# Patient Record
Sex: FEMALE | Race: WHITE | Employment: OTHER | URBAN - METROPOLITAN AREA
[De-identification: names, ages, dates, MRNs, and addresses within clinical notes are randomized per-mention and may not be internally consistent; named-entity substitution may affect disease eponyms.]

---

## 2017-01-13 ENCOUNTER — OFFICE VISIT (OUTPATIENT)
Dept: CARDIOLOGY CLINIC | Age: 82
End: 2017-01-13

## 2017-01-13 VITALS
HEIGHT: 65 IN | WEIGHT: 117.4 LBS | SYSTOLIC BLOOD PRESSURE: 138 MMHG | DIASTOLIC BLOOD PRESSURE: 82 MMHG | HEART RATE: 64 BPM | BODY MASS INDEX: 19.56 KG/M2 | OXYGEN SATURATION: 98 % | RESPIRATION RATE: 16 BRPM

## 2017-01-13 DIAGNOSIS — G47.00 INSOMNIA, UNSPECIFIED TYPE: ICD-10-CM

## 2017-01-13 DIAGNOSIS — E78.00 PURE HYPERCHOLESTEROLEMIA: Chronic | ICD-10-CM

## 2017-01-13 DIAGNOSIS — I48.0 PAROXYSMAL ATRIAL FIBRILLATION (HCC): Primary | ICD-10-CM

## 2017-01-13 DIAGNOSIS — F43.21 GRIEF REACTION: ICD-10-CM

## 2017-01-13 RX ORDER — SERTRALINE HYDROCHLORIDE 50 MG/1
TABLET, FILM COATED ORAL
Qty: 30 TAB | Refills: 5 | Status: SHIPPED | OUTPATIENT
Start: 2017-01-13 | End: 2017-02-09 | Stop reason: SDUPTHER

## 2017-01-13 RX ORDER — TRAZODONE HYDROCHLORIDE 50 MG/1
50 TABLET ORAL
Qty: 30 TAB | Refills: 1 | Status: SHIPPED | OUTPATIENT
Start: 2017-01-13 | End: 2017-04-12 | Stop reason: SDUPTHER

## 2017-01-13 NOTE — PROGRESS NOTES
[facility] [address], [address] [facility] [phone]; Fax [phone]      Patient: Ricco Mello  : 1928      Today's Date: 2017      HISTORY OF PRESENT ILLNESS:     History of Present Illness:  Ms. Carmelita De La Torre is here for follow-up PAF with RVR. Last seen in October by Dr. Izabella Lyons; by me in . Diltiazem increased to 180 mg daily and Eliquis 2.5 mg bid. Patient denies exertional chest pain, palpitations, edema, syncope or near-syncope, orthopnea or paroxysmal nocturnal dyspnea. Has some GROVE when walking up hills. Mrs. Carmelita De La Torre continues to receive grief counseling (  last year after 76 years of marriage), but counselor is only able to travel to her [place] once per month. Mrs. Carmelita De La Torre is spending more time alone. Reports decreased appetite, anhedonia, has to \"push\" herself to do anything. Has started taking trazodone in the morning because \"it doesn't help me sleep and I heard it was an antidepressant. \"  She has not slept well for years (cared for her  during his long illness prior to his death). EKG 6/17/15 - Afib with RVR, rate 124 (visualized by me)  EKG 6/17/15 - NSR, normal (visualized by me)  Echo 6/17/15 - LVEF 55-60%, normal atrial sizes  Lexiscan Cardiolite 6/18/15 - normal perfusion, LVEF 77%  Holter :  Sinus with artifact      PAST MEDICAL HISTORY:     Past Medical History   Diagnosis Date    Acid reflux     Depression     Hypercholesterolemia     PAF (paroxysmal atrial fibrillation) ([facility] [address]      On 6/17/15 she presented in Afib with RVR; spontaneously converted to NSR. Also Afib with RVR on 16.       Uterine prolapse          Past Surgical History   Procedure Laterality Date    Hx cholecystectomy      Hx appendectomy         MEDICATIONS:     Current Outpatient Prescriptions   Medication Sig Dispense Refill    apixaban (ELIQUIS) 2.5 mg tablet Take 1 tablet by mouth two  times daily 180 Tab 1    conjugated estrogens (PREMARIN) 0.625 mg/gram vaginal cream Insert 0.5 g into vagina every other day.  nitrofurantoin (MACRODANTIN) 50 mg capsule Take  by mouth as needed.  acetaminophen (TYLENOL) 325 mg tablet Take  by mouth every four (4) hours as needed for Pain.  diltiazem CD (CARDIZEM CD) 180 mg ER capsule Take 1 Cap by mouth daily. 30 Cap 12    pravastatin (PRAVACHOL) 40 mg tablet Take 0.5 Tabs by mouth nightly. 30 Tab 12    traZODone (DESYREL) 50 mg tablet Take  by mouth nightly. Began again on Friday.  omeprazole (PRILOSEC) 40 mg capsule Take 40 mg by mouth daily. Indications: GASTROESOPHAGEAL REFLUX  3    fish oil-omega-3 fatty acids (FISH OIL) 340-1,000 mg capsule Take 1 Cap by mouth daily.  cholecalciferol (VITAMIN D3) 1,000 unit tablet Take 1,000 Units by mouth daily.  ascorbic acid (VITAMIN C) 500 mg tablet Take 500 mg by mouth daily.  docusate sodium (COLACE) 100 mg capsule Take 100 mg by mouth nightly. Allergies   Allergen Reactions    Penicillins Swelling    Prednisone Other (comments)     Altered mental status. SOCIAL HISTORY:      Social History   Substance Use Topics    Smoking status: Never Smoker    Smokeless tobacco: Never Used    Alcohol use No       FAMILY HISTORY:     Family History   Problem Relation Age of Onset    Heart Disease Brother      irregular heart beat       REVIEW OF SYMPTOMS:      Review of Symptoms:  Constitutional: Negative for fever, chills  HEENT: Negative for vision changes. Respiratory: Negative for cough  Cardiovascular: Negative for orthopnea, syncope, and PND. Gastrointestinal: Negative for abdominal pain, diarrhea, or melena  Genitourinary: Negative for dysuria  Musculoskeletal: Negative for myalgias. + back pain   Skin: Negative for rash  Heme: No problems bleeding. Neurological: Negative for speech change and focal weakness.        PHYSICAL EXAM:      Physical Exam:  Visit Vitals    /82 (BP 1 Location: Left arm, BP Patient Position: Sitting)    Pulse 64    Resp 16    Ht 5' 5\" (1.651 m)    Wt 117 lb 6.4 oz (53.3 kg)    SpO2 98%    BMI 19.54 kg/m2        Patient appears generally well, mood and affect are appropriate and pleasant. HEENT: Normocephalic, atraumatic. Neck Exam: Supple  Lung Exam: Clear to auscultation, even breath sounds. Cardiac Exam: RRR with no murmur  Abdomen: Soft, non-tender  Extremities: No lower extremity edema. Psych: Appropriate affect      CARDIAC DIAGNOSTICS:      Cardiac Evaluation Includes:  EKG 6/17/15 - Afib with RVR, rate 124 (visualized by me)  EKG 6/17/15 - NSR, normal (visualized by me)  Echo 6/17/15 - LVEF 55-60%, normal atrial sizes  Lexiscan Cardiolite 6/18/15 - normal perfusion, LVEF 77%    ASSESSMENT AND PLAN:      Encounter Diagnoses   Name Primary?  Paroxysmal atrial fibrillation (HCC) Yes    Pure hypercholesterolemia     Grief reaction     Insomnia, unspecified type      Mrs. Ashish Dodd is doing well from a cardiac standpoint. She remains in sinus rhythm today. Will continue with diliazem and Eliquis. She was reminded that, if she became symptomatic with her afib, she should contact us. Her grief, depression and insomnia continue. She was encouraged to continue grief counseling, but to be seen at least twice per month. However, I believe she is also quite depressed. Long discussion with patient and her daughter about depression. She denies homicidal or suicidal ideation; contracts for safety. Will start sertraline 50 mg qam. Since trazodone doesn't seem to help her much, she was encouraged to discontinue it unless she takes it at bedtime as a sleep aid. Continue pravastatin for her dyslipidemia; Latonia Mar NP, is managing. Follow-up Disposition:  Return in about 4 weeks (around 2/10/2017).      Brenda Harris, ANP

## 2017-01-13 NOTE — MR AVS SNAPSHOT
Visit Information Date & Time Provider Department Dept. Phone Encounter #  
 1/13/2017  1:00 PM Ralph Lewis NP [facility] Alisha Kruger [id number] Follow-up Instructions Return in about 4 weeks (around 2/10/2017). Your Appointments 2/10/2017  1:00 PM  
ESTABLISHED PATIENT with Ralph Lewis NP  
[facility] ([address]) Appt Note: 4wk f/u  
 354 [facility] 600 [facility] [address]  
[phone]  
  
   
 [address] [place] [address]  
  
    
 5/4/2017  2:20 PM  
ESTABLISHED PATIENT with Chente Nicole MD  
[facility] ([address]) Appt Note: 6 mo fu appt 354 [facility] [address] [place] [address]  
54 e [facility] [address] [address] Upcoming Health Maintenance Date Due DTaP/Tdap/Td series (1 - Tdap) 2/12/1949 ZOSTER VACCINE AGE 60> 2/12/1988 GLAUCOMA SCREENING Q2Y 2/12/1993 OSTEOPOROSIS SCREENING (DEXA) 2/12/1993 Pneumococcal 65+ Low/Medium Risk (1 of 2 - PCV13) 2/12/1993 MEDICARE YEARLY EXAM 2/12/1993 INFLUENZA AGE 9 TO ADULT 8/1/2016 Allergies as of 1/13/2017  Review Complete On: 1/13/2017 By: Ralph Lewis NP Severity Noted Reaction Type Reactions Penicillins Medium 08/18/2013   Systemic Swelling Prednisone Medium 08/18/2013   Systemic Other (comments) Altered mental status. Current Immunizations  Never Reviewed No immunizations on file. Not reviewed this visit You Were Diagnosed With   
  
 Codes Comments Paroxysmal atrial fibrillation (HCC)    -  Primary ICD-10-CM: I48.0 ICD-9-CM: 427.31 Pure hypercholesterolemia     ICD-10-CM: E78.00 ICD-9-CM: 272.0 Grief reaction     ICD-10-CM: F43.20 ICD-9-CM: 309.0 Insomnia, unspecified type     ICD-10-CM: G47.00 ICD-9-CM: 780.52   
  
 Vitals BP Pulse Resp Height(growth percentile) Weight(growth percentile) SpO2  
 138/82 (BP 1 Location: Left arm, BP Patient Position: Sitting) 64 16 5' 5\" (1.651 m) 117 lb 6.4 oz (53.3 kg) 98% BMI OB Status Smoking Status 19.54 kg/m2 Postmenopausal Never Smoker Vitals History BMI and BSA Data Body Mass Index Body Surface Area  
 19.54 kg/m 2 1.56 m 2 Preferred Pharmacy Pharmacy Name [facility], [address] [phone] Your Updated Medication List  
  
   
This list is accurate as of: 1/13/17  2:06 PM.  Always use your most recent med list.  
  
  
  
  
 acetaminophen 325 mg tablet Commonly known as:  TYLENOL Take  by mouth every four (4) hours as needed for Pain. apixaban 2.5 mg tablet Commonly known as:  Princella Galarza Take 1 tablet by mouth two  times daily  
  
 ascorbic acid (vitamin C) 500 mg tablet Commonly known as:  VITAMIN C Take 500 mg by mouth daily. cholecalciferol 1,000 unit tablet Commonly known as:  VITAMIN D3 Take 1,000 Units by mouth daily. conjugated estrogens 0.625 mg/gram vaginal cream  
Commonly known as:  PREMARIN Insert 0.5 g into vagina every other day. dilTIAZem  mg ER capsule Commonly known as:  CARDIZEM CD Take 1 Cap by mouth daily. docusate sodium 100 mg capsule Commonly known as:  Juno Shankar Take 100 mg by mouth nightly. FISH -1,000 mg capsule Generic drug:  fish oil-omega-3 fatty acids Take 1 Cap by mouth daily. nitrofurantoin 50 mg capsule Commonly known as:  MACRODANTIN Take  by mouth as needed. omeprazole 40 mg capsule Commonly known as:  PRILOSEC Take 40 mg by mouth daily. Indications: GASTROESOPHAGEAL REFLUX  
  
 pravastatin 40 mg tablet Commonly known as:  PRAVACHOL Take 0.5 Tabs by mouth nightly. sertraline 50 mg tablet Commonly known as:  ZOLOFT  
 One pill by mouth every morning. traZODone 50 mg tablet Commonly known as:  Ayleen Cast Take 1 Tab by mouth nightly. Prescriptions Sent to Pharmacy Refills  
 traZODone (DESYREL) 50 mg tablet 1 Sig: Take 1 Tab by mouth nightly. Class: Normal  
 Pharmacy: [place] [address] [address]  #: [phone] Route: Oral  
 sertraline (ZOLOFT) 50 mg tablet 5 Sig: One pill by mouth every morning. Class: Normal  
 Pharmacy: [address] [facility] #: [phone] Follow-up Instructions Return in about 4 weeks (around 2/10/2017). Patient Instructions Trazodone is OPTIONAL. It's only there to help you sleep, and if it doesn't help, then don't take it. I am prescribing sertraline at 50 mg every morning. This, over time, should help alleviate your depression. It will take several weeks,however, so stick with it. I do want you seeing your counselor at least twice per month. Introducing [facility]! [employer] introduces scoo mobility patient portal. Now you can access parts of your medical record, email your doctor's office, and request medication refills online. 1. In your internet browser, go to [email or web address] 2. Click on the First Time User? Click Here link in the Sign In box. You will see the New Member Sign Up page. 3. Enter your scoo mobility Access Code exactly as it appears below. You will not need to use this code after youve completed the sign-up process. If you do not sign up before the expiration date, you must request a new code. · scoo mobility Access Code: [email or web address] Expires: 1/18/2017  1:46 PM 
 
4. Enter the last four digits of your Social Security Number (xxxx) and Date of Birth (mm/dd/yyyy) as indicated and click Submit. You will be taken to the next sign-up page. 5. Create a scoo mobility ID.  This will be your scoo mobility login ID and cannot be changed, so think of one that is secure and easy to remember. 6. Create a [employer] password. You can change your password at any time. 7. Enter your Password Reset Question and Answer. This can be used at a later time if you forget your password. 8. Enter your e-mail address. You will receive e-mail notification when new information is available in [address] 9. Click Sign Up. You can now view and download portions of your medical record. 10. Click the Download Summary menu link to download a portable copy of your medical information. If you have questions, please visit the Frequently Asked Questions section of the [employer] website. Remember, [employer] is NOT to be used for urgent needs. For medical emergencies, dial 911. Now available from your iPhone and Android! Please provide this summary of care documentation to your next provider. Your primary care clinician is listed as Jessica Nava. If you have any questions after today's visit, please call [phone].

## 2017-01-13 NOTE — PATIENT INSTRUCTIONS
Trazodone is OPTIONAL. It's only there to help you sleep, and if it doesn't help, then don't take it. I am prescribing sertraline at 50 mg every morning. This, over time, should help alleviate your depression. It will take several weeks,however, so stick with it. I do want you seeing your counselor at least twice per month.

## 2017-01-13 NOTE — PROGRESS NOTES
Visit Vitals    /82 (BP 1 Location: Left arm, BP Patient Position: Sitting)    Pulse 64    Resp 16    Ht 5' 5\" (1.651 m)    Wt 117 lb 6.4 oz (53.3 kg)    SpO2 98%    BMI 19.54 kg/m2

## 2017-01-23 ENCOUNTER — TELEPHONE (OUTPATIENT)
Dept: CARDIOLOGY CLINIC | Age: 82
End: 2017-01-23

## 2017-01-23 NOTE — TELEPHONE ENCOUNTER
Angel Lawton - please call patient - I would defer all questions about this to [facility] since she started this.  As a cardiologist, I would defer management of depression to her PCP.    Thanks,   SK (Routing comment)

## 2017-01-23 NOTE — TELEPHONE ENCOUNTER
I dont know anything about this medication since it is not cardiac. Will have to forward to Dr Juan Jose Mosley.

## 2017-01-23 NOTE — TELEPHONE ENCOUNTER
Patient called again about this medication. I advised that someone will call her back.       Thank you, Nura Monae

## 2017-01-23 NOTE — TELEPHONE ENCOUNTER
She said she already talked w/a nurse today and was told that it would be ok if she wanted to stop it and Ms Rae Lin said she did not take a dose today and believes she is already feeling better.

## 2017-01-23 NOTE — TELEPHONE ENCOUNTER
Pt called and stated at her office visit last week Agnes prescribed her Zoloft to treat depression. She stated she has been more depressed since taking it. I informed her it takes several weeks to start working but she would like to speak with you in regards to this. Please return her phone call to [phone], Thanks!

## 2017-02-03 NOTE — TELEPHONE ENCOUNTER
Crista Andrews SUE ORELLANA [facility]  Female, 80 y.o., 2/12/1928  Weight:   117 lb 6.4 oz (53.3 kg)  Phone:   [phone]  PCP:   Jannis Halsted, NP  MRN:   [id number]  MyChart:   Code Exp  Next Appt:   02/09/2017    Message  Received: Today       TREVER Castellano LPN; Gavin Russo RN; Everton Fraga MD       Caller: Unspecified (1 week ago)                       Previous Messages       ----- Message -----      From: Everton Fraga MD      Sent: 1/23/2017  12:19 PM        To: Gavin Russo RN, Parish Mitchell NP     Brain Eglin - please call patient - I would defer all questions about this to [facility] since she started this.  As a cardiologist, I would defer management of depression to her PCP. Thanks,   SK       ----- Message -----      From: Gavin Russo RN      Sent: 1/23/2017  11:48 AM        To: Everton Fraga MD                           Medication Evaluation            Parish Mitchell NP   You; Gavin Russo RN; Everton Fraga MD 2 hours ago (11:46 AM)               Called and left message for patient. Since I have 17 years of experience in treating depression, I felt confident in treating this patient and then referring her on for follow-up to her PCP.                 Documentation                          Gavin Russo RN 11 days ago                 She said she already talked w/a nurse today and was told that it would be ok if she wanted to stop it and Ms Magdalene Sandra said she did not take a dose today and believes she is already feeling better.                  Documentation                          Gavin Russo RN 11 days ago                 Brain Robertin - please call patient - I would defer all questions about this to [facility] since she started this.  As a cardiologist, I would defer management of depression to her PCP.    Thanks,   SK (Routing comment)                  Documentation                          Cristina Smith routed conversation to Gavin Russo RN 11 days ago                       Andrea Merramya 11 days ago                 Patient called again about this medication. I advised that someone will call her back.      Thank you, Alpesh Roth                  Documentation                          Gypsy Flores. Sabrina [phone]  Andrea Merl 11 days ago                       MD Abner Barnes, RN; Jose Rowe NP 11 days ago                   Katelyn Ware - please call patient - I would defer all questions about this to [facility] since she started this.  As a cardiologist, I would defer management of depression to her PCP. Thanks,   SK (Routing comment)                        Abner Hernandez, RN routed conversation to Niko Elizabeth MD 11 days ago                       Abner Hernandez, NEFTALY 11 days ago                 I dont know anything about this medication since it is not cardiac. Will have to forward to Dr Winston Leo.  Jero [place] routed conversation to Abner Hernandez, RN 11 days ago                       Gypsy Flores. [facility] [phone]  Manisha Harviell 11 days ago                       Manisha Harviell 11 days ago                 Pt called and stated at her office visit last week Agnes prescribed her Zoloft to treat depression. She stated she has been more depressed since taking it. I informed her it takes several weeks to start working but she would like to speak with you in regards to this.  Please return her phone call to [phone], Thanks!                  Documentation

## 2017-02-03 NOTE — TELEPHONE ENCOUNTER
Called and left message for patient. Since I have 17 years of experience in treating depression, I felt confident in treating this patient and then referring her on for follow-up to her PCP.

## 2017-02-09 ENCOUNTER — OFFICE VISIT (OUTPATIENT)
Dept: CARDIOLOGY CLINIC | Age: 82
End: 2017-02-09

## 2017-02-09 VITALS
SYSTOLIC BLOOD PRESSURE: 126 MMHG | RESPIRATION RATE: 16 BRPM | BODY MASS INDEX: 18.83 KG/M2 | HEIGHT: 65 IN | OXYGEN SATURATION: 96 % | WEIGHT: 113 LBS | DIASTOLIC BLOOD PRESSURE: 64 MMHG | HEART RATE: 64 BPM

## 2017-02-09 DIAGNOSIS — E78.00 PURE HYPERCHOLESTEROLEMIA: ICD-10-CM

## 2017-02-09 DIAGNOSIS — I48.0 PAROXYSMAL ATRIAL FIBRILLATION (HCC): Primary | ICD-10-CM

## 2017-02-09 DIAGNOSIS — F43.21 GRIEF REACTION: ICD-10-CM

## 2017-02-09 RX ORDER — SERTRALINE HYDROCHLORIDE 25 MG/1
TABLET, FILM COATED ORAL
Qty: 30 TAB | Refills: 5 | Status: SHIPPED | OUTPATIENT
Start: 2017-02-09 | End: 2017-05-04

## 2017-02-09 NOTE — PROGRESS NOTES
[address] [place] [address]., [address] [place] [address], [id number] [place] [address] [phone]; Fax [phone]      Patient: Ezra Randle  : 1928      Today's Date: 2017      HISTORY OF PRESENT ILLNESS:     History of Present Illness:  Ms. Jame Moise is here for follow-up of depression, as well as PAF with RVR. Started her on Zoloft 50 mg at last visit 3 weeks ago; felt more depressed on the 50 mg so decreased to 25 mg and has taken the reduced dose for the past two weeks. Daughter has noticed some changes in her mother. Appetite is better. Slept well for the first time in \"forever\" last night! Less \"despairing. \"  Is still seeing counselor, but just once per month vs. the twice monthly I had recommended at last visit. Daughter is willing to drive patient to counselor when counselor is not seeing clients at [employer]. She will turn 80 later this week; has plans to have lunch in [place] with her daughters to celebrate. Taking all other medications as directed. No bleeding issues. Patient denies exertional chest pain, palpitations, edema, syncope or near-syncope, orthopnea or paroxysmal nocturnal dyspnea. Has some GROVE when walking up hills. EKG 6/17/15 - Afib with RVR, rate 124 (visualized by me)  EKG 6/17/15 - NSR, normal (visualized by me)  Echo 6/17/15 - LVEF 55-60%, normal atrial sizes  Lexiscan Cardiolite 6/18/15 - normal perfusion, LVEF 77%  Holter :  Sinus with artifact      PAST MEDICAL HISTORY:     Past Medical History   Diagnosis Date    Acid reflux     Depression     Hypercholesterolemia     PAF (paroxysmal atrial fibrillation) ([facility] [address]      On 6/17/15 she presented in Afib with RVR; spontaneously converted to NSR. Also Afib with RVR on 16.       Uterine prolapse          Past Surgical History   Procedure Laterality Date    Hx cholecystectomy      Hx appendectomy         MEDICATIONS:     Current Outpatient Prescriptions   Medication Sig Dispense Refill    traZODone (DESYREL) 50 mg tablet Take 1 Tab by mouth nightly. 30 Tab 1    sertraline (ZOLOFT) 50 mg tablet One pill by mouth every morning. (Patient taking differently: Take 25 mg by mouth daily. One pill by mouth every morning.) 30 Tab 5    apixaban (ELIQUIS) 2.5 mg tablet Take 1 tablet by mouth two  times daily 180 Tab 1    conjugated estrogens (PREMARIN) 0.625 mg/gram vaginal cream Insert 0.5 g into vagina every other day.  nitrofurantoin (MACRODANTIN) 50 mg capsule Take  by mouth as needed.  acetaminophen (TYLENOL) 325 mg tablet Take  by mouth every four (4) hours as needed for Pain.  diltiazem CD (CARDIZEM CD) 180 mg ER capsule Take 1 Cap by mouth daily. 30 Cap 12    pravastatin (PRAVACHOL) 40 mg tablet Take 0.5 Tabs by mouth nightly. 30 Tab 12    omeprazole (PRILOSEC) 40 mg capsule Take 40 mg by mouth daily. Indications: GASTROESOPHAGEAL REFLUX  3    fish oil-omega-3 fatty acids (FISH OIL) 340-1,000 mg capsule Take 1 Cap by mouth daily.  cholecalciferol (VITAMIN D3) 1,000 unit tablet Take 1,000 Units by mouth daily.  ascorbic acid (VITAMIN C) 500 mg tablet Take 500 mg by mouth daily.  docusate sodium (COLACE) 100 mg capsule Take 100 mg by mouth nightly. Allergies   Allergen Reactions    Penicillins Swelling    Prednisone Other (comments)     Altered mental status. SOCIAL HISTORY:      Social History   Substance Use Topics    Smoking status: Never Smoker    Smokeless tobacco: Never Used    Alcohol use No       FAMILY HISTORY:     Family History   Problem Relation Age of Onset    Heart Disease Brother      irregular heart beat       REVIEW OF SYMPTOMS:      Review of Symptoms:  Constitutional: Negative for fever, chills  HEENT: Negative for vision changes. Respiratory: Negative for cough  Cardiovascular: Negative for orthopnea, syncope, and PND.    Gastrointestinal: Negative for abdominal pain, diarrhea, or melena  Genitourinary: Negative for dysuria  Musculoskeletal: Negative for myalgias. + back pain   Skin: Negative for rash  Heme: No problems bleeding. Neurological: Negative for speech change and focal weakness. PHYSICAL EXAM:      Physical Exam:  Visit Vitals    /64 (BP 1 Location: Left arm, BP Patient Position: Sitting)    Pulse 64  Comment: regular    Resp 16    Ht 5' 5\" (1.651 m)    Wt 113 lb (51.3 kg)    SpO2 96%    BMI 18.8 kg/m2        Patient appears generally well, mood and affect are appropriate and pleasant. HEENT: Normocephalic, atraumatic. Neck Exam: Supple  Lung Exam: Clear to auscultation, even breath sounds. Cardiac Exam: RRR with no murmur  Abdomen: Soft, non-tender  Extremities: No lower extremity edema. Psych: Appropriate affect      CARDIAC DIAGNOSTICS:      Cardiac Evaluation Includes:  EKG 6/17/15 - Afib with RVR, rate 124 (visualized by me)  EKG 6/17/15 - NSR, normal (visualized by me)  Echo 6/17/15 - LVEF 55-60%, normal atrial sizes  Lexiscan Cardiolite 6/18/15 - normal perfusion, LVEF 77%    ASSESSMENT AND PLAN:      Encounter Diagnoses   Name Primary?  Grief reaction     Paroxysmal atrial fibrillation (HCC) Yes    Pure hypercholesterolemia      Mrs. Terrence Perez is doing well from a cardiac standpoint. She remains in sinus rhythm today. Will continue with diliazem and Eliquis. She was reminded that, if she became symptomatic with her afib, she should contact us. Since she is starting to notice some positive changes with the addition of very low-dose Zoloft, will continue it at 25 mg at bedtime, along with her trazodone. She will call in 3 weeks so that we may discuss; can uptitrate dose if needed. Reminded her again that I would advise counseling twice per month for the present. She denies homicidal or suicidal ideation; contracts for safety. Continue pravastatin for her dyslipidemia; Nani Falk NP, is managing. Follow up with Dr. Jabari Coronado in 5/17 as planned.     Rajan Dietrich, ANP

## 2017-02-09 NOTE — PROGRESS NOTES
Visit Vitals    /64 (BP 1 Location: Left arm, BP Patient Position: Sitting)    Pulse 64  Comment: regular    Resp 16    Ht 5' 5\" (1.651 m)    Wt 113 lb (51.3 kg)    SpO2 96%    BMI 18.8 kg/m2

## 2017-02-09 NOTE — PATIENT INSTRUCTIONS
Please call me on March 2. I want you to call [phone] and ask for my nurse, Karen Keyes. Tell her that I asked you to call and leave a phone number and a time you'll be available for me to call you back. Best times for me for phone calls are at lunchtime and late afternoon. Please continue with the 25 mg dose of sertraline (Zoloft) and take it at bedtime as you are doing. I've sent in a prescription for the lower dose, so when you finish your current supply, you may  your new prescription and then take a whole pill every night at bedtime. OK to continue the trazodone for sleep. Make sure you take this at bedtime! I would strongly encourage you to arrange for counseling at least TWICE per MONTH for the forseeable future. This may change, but for now, I'd like you to have the extra support.

## 2017-02-09 NOTE — MR AVS SNAPSHOT
Visit Information Date & Time Provider Department Dept. Phone Encounter #  
 2/9/2017 10:30 AM Danisha Johnson NP [facility] Corby Ansari [phone] [id number] Your Appointments 5/4/2017  2:20 PM  
ESTABLISHED PATIENT with Lazarus Dad, MD  
[facility] ([address]) Appt Note: 6 mo fu appt 354 [facility] 600 1007 [id number] [place] [address] Upcoming Health Maintenance Date Due DTaP/Tdap/Td series (1 - Tdap) 2/12/1949 ZOSTER VACCINE AGE 60> 2/12/1988 GLAUCOMA SCREENING Q2Y 2/12/1993 OSTEOPOROSIS SCREENING (DEXA) 2/12/1993 Pneumococcal 65+ Low/Medium Risk (1 of 2 - PCV13) 2/12/1993 MEDICARE YEARLY EXAM 2/12/1993 INFLUENZA AGE 9 TO ADULT 8/1/2016 Allergies as of 2/9/2017  Review Complete On: 2/9/2017 By: Danisha Johnson NP Severity Noted Reaction Type Reactions Penicillins Medium 08/18/2013   Systemic Swelling Prednisone Medium 08/18/2013   Systemic Other (comments) Altered mental status. Current Immunizations  Never Reviewed No immunizations on file. Not reviewed this visit You Were Diagnosed With   
  
 Codes Comments Grief reaction     ICD-10-CM: F43.20 ICD-9-CM: 309.0 Vitals BP Pulse Resp Height(growth percentile) Weight(growth percentile) SpO2  
 126/64 (BP 1 Location: Left arm, BP Patient Position: Sitting) 64 16 5' 5\" (1.651 m) 113 lb (51.3 kg) 96% BMI OB Status Smoking Status 18.8 kg/m2 Postmenopausal Never Smoker Vitals History BMI and BSA Data Body Mass Index Body Surface Area  
 18.8 kg/m 2 1.53 m 2 Preferred Pharmacy Pharmacy Name [facility] [address] [employer] [phone] Your Updated Medication List  
  
   
 This list is accurate as of: 2/9/17 11:34 AM.  Always use your most recent med list.  
  
  
  
  
 acetaminophen 325 mg tablet Commonly known as:  TYLENOL Take  by mouth every four (4) hours as needed for Pain. apixaban 2.5 mg tablet Commonly known as:  Sandy Spatz Take 1 tablet by mouth two  times daily  
  
 ascorbic acid (vitamin C) 500 mg tablet Commonly known as:  VITAMIN C Take 500 mg by mouth daily. cholecalciferol 1,000 unit tablet Commonly known as:  VITAMIN D3 Take 1,000 Units by mouth daily. conjugated estrogens 0.625 mg/gram vaginal cream  
Commonly known as:  PREMARIN Insert 0.5 g into vagina every other day. dilTIAZem  mg ER capsule Commonly known as:  CARDIZEM CD Take 1 Cap by mouth daily. docusate sodium 100 mg capsule Commonly known as:  Ledell Jw Take 100 mg by mouth nightly. FISH -1,000 mg capsule Generic drug:  fish oil-omega-3 fatty acids Take 1 Cap by mouth daily. nitrofurantoin 50 mg capsule Commonly known as:  MACRODANTIN Take  by mouth as needed. omeprazole 40 mg capsule Commonly known as:  PRILOSEC Take 40 mg by mouth daily. Indications: GASTROESOPHAGEAL REFLUX  
  
 pravastatin 40 mg tablet Commonly known as:  PRAVACHOL Take 0.5 Tabs by mouth nightly. sertraline 25 mg tablet Commonly known as:  ZOLOFT One pill by mouth every bedtime. traZODone 50 mg tablet Commonly known as:  Paula Juan Take 1 Tab by mouth nightly. Prescriptions Sent to Pharmacy Refills  
 sertraline (ZOLOFT) 25 mg tablet 5 Sig: One pill by mouth every bedtime. Class: Normal  
 Pharmacy: [place], [place] - [address]  #: [phone] Patient Instructions Please call me on March 2. I want you to call [phone] and ask for my nurse, Cynthia Del Rio.   Tell her that I asked you to call and leave a phone number and a time you'll be available for me to call you back. Best times for me for phone calls are at lunchtime and late afternoon. Please continue with the 25 mg dose of sertraline (Zoloft) and take it at bedtime as you are doing. I've sent in a prescription for the lower dose, so when you finish your current supply, you may  your new prescription and then take a whole pill every night at bedtime. OK to continue the trazodone for sleep. Make sure you take this at bedtime! I would strongly encourage you to arrange for counseling at least TWICE per MONTH for the forseeable future. This may change, but for now, I'd like you to have the extra support. Introducing [facility] & HEALTH SERVICES! [facility] introduces S[employer]plus patient portal. Now you can access parts of your medical record, email your doctor's office, and request medication refills online. 1. In your internet browser, go to [email or web address] 2. Click on the First Time User? Click Here link in the Sign In box. You will see the New Member Sign Up page. 3. Enter your SNSplus Access Code exactly as it appears below. You will not need to use this code after youve completed the sign-up process. If you do not sign up before the expiration date, you must request a new code. · SNSplus Access Code: [email or web address] Expires: 5/10/2017 11:34 AM 
 
4. Enter the last four digits of your Social Security Number (xxxx) and Date of Birth (mm/dd/yyyy) as indicated and click Submit. You will be taken to the next sign-up page. 5. Create a [employer] ID. This will be your SNSplus login ID and cannot be changed, so think of one that is secure and easy to remember. 6. Create a [employer] password. You can change your password at any time. 7. Enter your Password Reset Question and Answer. This can be used at a later time if you forget your password. 8. Enter your e-mail address.  You will receive e-mail notification when new information is available in Effdon. 9. Click Sign Up. You can now view and download portions of your medical record. 10. Click the Download Summary menu link to download a portable copy of your medical information. If you have questions, please visit the Frequently Asked Questions section of the Eff[employer] website. Remember, [employer] is NOT to be used for urgent needs. For medical emergencies, dial 911. Now available from your iPhone and Android! Please provide this summary of care documentation to your next provider. Your primary care clinician is listed as Belgica Liang. If you have any questions after today's visit, please call [phone].

## 2017-03-13 ENCOUNTER — TELEPHONE (OUTPATIENT)
Dept: CARDIOLOGY CLINIC | Age: 82
End: 2017-03-13

## 2017-03-13 NOTE — TELEPHONE ENCOUNTER
Patient states she continues to feel horrible on zoloft even after increasing her dose to 50mg daily. She has stopped taking this medication and would like to discuss an alternative. Advise for her to f/u with her PCP to discuss this further. She voices understanding.

## 2017-04-12 DIAGNOSIS — G47.00 INSOMNIA, UNSPECIFIED TYPE: ICD-10-CM

## 2017-04-13 RX ORDER — TRAZODONE HYDROCHLORIDE 50 MG/1
TABLET ORAL
Qty: 30 TAB | Refills: 1 | Status: SHIPPED | OUTPATIENT
Start: 2017-04-13 | End: 2019-01-18

## 2017-05-04 ENCOUNTER — OFFICE VISIT (OUTPATIENT)
Dept: CARDIOLOGY CLINIC | Age: 82
End: 2017-05-04

## 2017-05-04 VITALS
RESPIRATION RATE: 16 BRPM | WEIGHT: 112 LBS | SYSTOLIC BLOOD PRESSURE: 110 MMHG | HEIGHT: 65 IN | BODY MASS INDEX: 18.66 KG/M2 | DIASTOLIC BLOOD PRESSURE: 70 MMHG | HEART RATE: 76 BPM | OXYGEN SATURATION: 98 %

## 2017-05-04 DIAGNOSIS — I48.0 PAROXYSMAL ATRIAL FIBRILLATION (HCC): Primary | ICD-10-CM

## 2017-05-04 DIAGNOSIS — E78.00 PURE HYPERCHOLESTEROLEMIA: Chronic | ICD-10-CM

## 2017-05-04 RX ORDER — LANOLIN ALCOHOL/MO/W.PET/CERES
1000 CREAM (GRAM) TOPICAL DAILY
COMMUNITY
End: 2020-12-16

## 2017-05-04 NOTE — MR AVS SNAPSHOT
Visit Information Date & Time Provider Department Dept. Phone Encounter #  
 5/4/2017  2:20 PM Wil Nascimento MD [facility] Carol[facility] Ozuna [phone] [id number] Upcoming Health Maintenance Date Due DTaP/Tdap/Td series (1 - Tdap) 2/12/1949 ZOSTER VACCINE AGE 60> 2/12/1988 GLAUCOMA SCREENING Q2Y 2/12/1993 OSTEOPOROSIS SCREENING (DEXA) 2/12/1993 Pneumococcal 65+ Low/Medium Risk (1 of 2 - PCV13) 2/12/1993 MEDICARE YEARLY EXAM 2/12/1993 INFLUENZA AGE 9 TO ADULT 8/1/2017 Allergies as of 5/4/2017  Review Complete On: 5/4/2017 By: Wil Nascimento MD  
  
 Severity Noted Reaction Type Reactions Penicillins Medium 08/18/2013   Systemic Swelling Prednisone Medium 08/18/2013   Systemic Other (comments) Altered mental status. Current Immunizations  Never Reviewed No immunizations on file. Not reviewed this visit You Were Diagnosed With   
  
 Codes Comments Paroxysmal atrial fibrillation (HCC)    -  Primary ICD-10-CM: I48.0 ICD-9-CM: 427.31 Pure hypercholesterolemia     ICD-10-CM: E78.00 ICD-9-CM: 272.0 Vitals BP Pulse Resp Height(growth percentile) Weight(growth percentile) SpO2  
 110/70 (BP 1 Location: Left arm, BP Patient Position: Sitting) 76 16 5' 5\" (1.651 m) 112 lb (50.8 kg) 98% BMI OB Status Smoking Status 18.64 kg/m2 Postmenopausal Never Smoker BMI and BSA Data Body Mass Index Body Surface Area  
 18.64 kg/m 2 1.53 m 2 Preferred Pharmacy Pharmacy Name Phone [facility], [address] [phone] Your Updated Medication List  
  
   
This list is accurate as of: 5/4/17  3:13 PM.  Always use your most recent med list.  
  
  
  
  
 acetaminophen 325 mg tablet Commonly known as:  TYLENOL Take  by mouth every four (4) hours as needed for Pain. apixaban 2.5 mg tablet Commonly known as:  Erica Corbett Take 1 tablet by mouth two  times daily  
  
 ascorbic acid (vitamin C) 500 mg tablet Commonly known as:  VITAMIN C Take 500 mg by mouth daily. cholecalciferol 1,000 unit tablet Commonly known as:  VITAMIN D3 Take 1,000 Units by mouth daily. conjugated estrogens 0.625 mg/gram vaginal cream  
Commonly known as:  PREMARIN Insert 0.5 g into vagina every other day. cyanocobalamin 1,000 mcg tablet Take 1,000 mcg by mouth daily. dilTIAZem  mg ER capsule Commonly known as:  CARDIZEM CD Take 1 Cap by mouth daily. docusate sodium 100 mg capsule Commonly known as:  Anika Nathaly Take 100 mg by mouth nightly. FISH -1,000 mg capsule Generic drug:  fish oil-omega-3 fatty acids Take 1 Cap by mouth daily. nitrofurantoin 50 mg capsule Commonly known as:  MACRODANTIN Take  by mouth as needed. omeprazole 40 mg capsule Commonly known as:  PRILOSEC Take 40 mg by mouth daily. Indications: GASTROESOPHAGEAL REFLUX  
  
 pravastatin 40 mg tablet Commonly known as:  PRAVACHOL Take 0.5 Tabs by mouth nightly. traZODone 50 mg tablet Commonly known as:  DESYREL  
TAKE 1 TABLET BY MOUTH EACH NIGHT Introducing [facility] & HEALTH SERVICES! Tanis Epley introduces [employer] patient portal. Now you can access parts of your medical record, email your doctor's office, and request medication refills online. 1. In your internet browser, go to [email or web address] 2. Click on the First Time User? Click Here link in the Sign In box. You will see the New Member Sign Up page. 3. Enter your TrafficLand Access Code exactly as it appears below. You will not need to use this code after youve completed the sign-up process. If you do not sign up before the expiration date, you must request a new code. · TrafficLand Access Code: [email or web address] Expires: 5/10/2017 12:34 PM 
 
4.  Enter the last four digits of your Social Security Number (xxxx) and Date of Birth (mm/dd/yyyy) as indicated and click Submit. You will be taken to the next sign-up page. 5. Create a Integrity Directional Services ID. This will be your [employer] login ID and cannot be changed, so think of one that is secure and easy to remember. 6. Create a [employer] Services password. You can change your password at any time. 7. Enter your Password Reset Question and Answer. This can be used at a later time if you forget your password. 8. Enter your e-mail address. You will receive e-mail notification when new information is available in [address] 9. Click Sign Up. You can now view and download portions of your medical record. 10. Click the Download Summary menu link to download a portable copy of your medical information. If you have questions, please visit the Frequently Asked Questions section of the [employer] website. Remember, [employer] is NOT to be used for urgent needs. For medical emergencies, dial 911. Now available from your iPhone and Android! Please provide this summary of care documentation to your next provider. Your primary care clinician is listed as Heydi Simmons. If you have any questions after today's visit, please call [phone].

## 2017-05-04 NOTE — PROGRESS NOTES
Visit Vitals    /70 (BP 1 Location: Left arm, BP Patient Position: Sitting)    Pulse 76    Resp 16    Ht 5' 5\" (1.651 m)    Wt 112 lb (50.8 kg)    SpO2 98%    BMI 18.64 kg/m2

## 2017-05-04 NOTE — PROGRESS NOTES
Christiane Sal MD. [facility]              Patient: Marguerite Snyder  : 1928      Today's Date: 2017          HISTORY OF PRESENT ILLNESS:     History of Present Illness:  Here for FU. She has some with her left shoulder - seeing Ortho. Cardiac wise is stable - no heart racing. No Cp or SOB. No dizziness. No bleeding. PAST MEDICAL HISTORY:     Past Medical History:   Diagnosis Date    Acid reflux     Depression     Hypercholesterolemia     PAF (paroxysmal atrial fibrillation) ([facility] [address]     On 6/17/15 she presented in Afib with RVR; spontaneously converted to NSR. Also Afib with RVR on 16.  Uterine prolapse          Past Surgical History:   Procedure Laterality Date    HX APPENDECTOMY      HX CHOLECYSTECTOMY             MEDICATIONS:     Current Outpatient Prescriptions   Medication Sig Dispense Refill    cyanocobalamin 1,000 mcg tablet Take 1,000 mcg by mouth daily.  traZODone (DESYREL) 50 mg tablet TAKE 1 TABLET BY MOUTH EACH NIGHT 30 Tab 1    apixaban (ELIQUIS) 2.5 mg tablet Take 1 tablet by mouth two  times daily 180 Tab 1    conjugated estrogens (PREMARIN) 0.625 mg/gram vaginal cream Insert 0.5 g into vagina every other day.  nitrofurantoin (MACRODANTIN) 50 mg capsule Take  by mouth as needed.  acetaminophen (TYLENOL) 325 mg tablet Take  by mouth every four (4) hours as needed for Pain.  diltiazem CD (CARDIZEM CD) 180 mg ER capsule Take 1 Cap by mouth daily. 30 Cap 12    pravastatin (PRAVACHOL) 40 mg tablet Take 0.5 Tabs by mouth nightly. 30 Tab 12    omeprazole (PRILOSEC) 40 mg capsule Take 40 mg by mouth daily. Indications: GASTROESOPHAGEAL REFLUX  3    fish oil-omega-3 fatty acids (FISH OIL) 340-1,000 mg capsule Take 1 Cap by mouth daily.  cholecalciferol (VITAMIN D3) 1,000 unit tablet Take 1,000 Units by mouth daily.  ascorbic acid (VITAMIN C) 500 mg tablet Take 500 mg by mouth daily.       docusate sodium (COLACE) 100 mg capsule Take 100 mg by mouth nightly. Allergies   Allergen Reactions    Penicillins Swelling    Prednisone Other (comments)     Altered mental status. SOCIAL HISTORY:     Social History   Substance Use Topics    Smoking status: Never Smoker    Smokeless tobacco: Never Used    Alcohol use No         FAMILY HISTORY:     Family History   Problem Relation Age of Onset    Heart Disease Brother      irregular heart beat            REVIEW OF SYMPTOMS:          Review of Symptoms:  Constitutional: Negative for fever, chills  HEENT: Negative for vision changes.    Respiratory: Negative for cough  Cardiovascular: Negative for orthopnea, syncope, and PND.    Gastrointestinal: Negative for abdominal pain, diarrhea, or melena  Genitourinary: Negative for dysuria  Musculoskeletal: Negative for myalgias. + back pain    Skin: Negative for rash  Heme: No major problems bleeding. Neurological: Negative for speech change and focal weakness.               PHYSICAL EXAM:          Physical Exam:  Visit Vitals    /70 (BP 1 Location: Left arm, BP Patient Position: Sitting)    Pulse 76    Resp 16    Ht 5' 5\" (1.651 m)    Wt 112 lb (50.8 kg)    SpO2 98%    BMI 18.64 kg/m2       Patient appears generally well, mood and affect are appropriate and pleasant. HEENT: Normocephalic, atraumatic. [place] anicteric. Hearing intact.    Neck Exam: Supple, no bruits   Lung Exam: Clear to auscultation, even breath sounds.    Cardiac Exam: RRR with no murmur  Abdomen: Soft, non-tender, no bruits   Extremities: No lower extremity edema.  MAW  Vascular: 2+ DP's  Psych: Appropriate affect  Neuro - non focal           LABS / OTHER STUDIES:               Labs 7/25/15 - Dig level 0.6     Labs 9/13/16 - CMP OK, chol 186, , HDL 77, LDL 77          CARDIAC DIAGNOSTICS:          Cardiac Evaluation Includes:  Echo 6/17/15 - LVEF 55-60%, normal atrial sizes  Lexiscan Cardiolite 6/18/15 - normal perfusion, LVEF 77%  Loop Monitor 4/11/16-5/10/16 - no afib       EKG 6/17/15 - Afib with RVR, rate 124 (visualized by me)  EKG 6/17/15 - NSR, normal (visualized by me)  EKG 7/11/16 - Afib with RVR, rate 130, NSST changes ---> later converted to NSR, NSST changes    EKG 7/22/16 - NSR, PAC, non-specific T wave abn                   ASSESSMENT AND PLAN:          Assessment and Plan:  1) PAF    - On 6/17/15 she presented in Afib with RVR;. She spontaneously converted to NSR.    - On 7/11/16 she was seen with afib with RVR in [facility]. Converted to NSR on Diltiazem gtt. - She notes a couple of other episodes of PAF  - she is tolerating Cardizem for the most part. She has chronic fatigue that is likely non-cardiac related. She had fatigue from metoprolol. She says she took Digoxin once and did not like the way it made her feel (was a long time ago).    - On 7/16 - Increased Cardizem to 180 mg daily. If more PAF with RVR, will refer to EP to consider further adjusting medications vs ablation (Afib vs AV Node ablation + Pacemaker). - Continue Eliquis 2.5 mg BID (INNKK7Trat score is 3) - no major problems bleeding   - She is doing OK on 5/4/17       2) Dyslipidemia   - I told her she can stop pravastatin if she wants     3) Depression   - goes to grief cancer   - she is looking better       4) See me back in 12 months. Patient expressed understanding of the plan - questions were answered.       She lost her  of 76 years in March 2015. She has 2 daughters - lives with her son. [place].            Harman Oliva MD, [address], [place], [address] [place] [address]  Ph: [phone] Ph [phone]            ADDENDUM   5/21/2017  Labs 5/12/17 - CMP OK, chol 244, , HDL 76, , TSH 1.4

## 2017-07-21 RX ORDER — DILTIAZEM HYDROCHLORIDE 180 MG/1
CAPSULE, COATED, EXTENDED RELEASE ORAL
Qty: 30 CAP | Refills: 12 | Status: SHIPPED | OUTPATIENT
Start: 2017-07-21 | End: 2018-08-13 | Stop reason: SDUPTHER

## 2017-08-16 RX ORDER — APIXABAN 2.5 MG/1
TABLET, FILM COATED ORAL
Qty: 180 TAB | Refills: 1 | Status: SHIPPED | OUTPATIENT
Start: 2017-08-16 | End: 2017-11-16 | Stop reason: SDUPTHER

## 2017-11-16 ENCOUNTER — OFFICE VISIT (OUTPATIENT)
Dept: CARDIOLOGY CLINIC | Age: 82
End: 2017-11-16

## 2017-11-16 VITALS
DIASTOLIC BLOOD PRESSURE: 76 MMHG | BODY MASS INDEX: 18.73 KG/M2 | HEIGHT: 65 IN | OXYGEN SATURATION: 97 % | HEART RATE: 73 BPM | SYSTOLIC BLOOD PRESSURE: 124 MMHG | WEIGHT: 112.4 LBS | RESPIRATION RATE: 16 BRPM

## 2017-11-16 DIAGNOSIS — I48.0 PAROXYSMAL ATRIAL FIBRILLATION (HCC): Primary | ICD-10-CM

## 2017-11-16 DIAGNOSIS — E78.00 PURE HYPERCHOLESTEROLEMIA: Chronic | ICD-10-CM

## 2017-11-16 RX ORDER — PANTOPRAZOLE SODIUM 40 MG/1
40 TABLET, DELAYED RELEASE ORAL DAILY
COMMUNITY
Start: 2017-11-13 | End: 2019-01-18

## 2017-11-16 NOTE — PROGRESS NOTES
Gee Flores MD. [facility]              Patient: Royer Niño  : 1928      Today's Date: 2017            HISTORY OF PRESENT ILLNESS:     History of Present Illness:  Ms. Salma Carrasco is here for follow-up. A little depressed. OK cardiac wise  No heart racing spells. No CP or SOB. PAST MEDICAL HISTORY:     Past Medical History:   Diagnosis Date    Acid reflux     Depression     Hypercholesterolemia     PAF (paroxysmal atrial fibrillation) ([address]     On 6/17/15 she presented in Afib with RVR; spontaneously converted to NSR. Also Afib with RVR on 16.  Uterine prolapse          Past Surgical History:   Procedure Laterality Date    HX APPENDECTOMY      HX CHOLECYSTECTOMY           MEDICATIONS:     Current Outpatient Prescriptions   Medication Sig Dispense Refill    pantoprazole (PROTONIX) 40 mg tablet Take 40 mg by mouth daily.  ELIQUIS 2.5 mg tablet Take 1 tablet by mouth two  times daily 180 Tab 1    dilTIAZem CD (CARDIZEM CD) 180 mg ER capsule TAKE 1 CAPSULE BY MOUTH ONCE DAILY 30 Cap 12    cyanocobalamin 1,000 mcg tablet Take 1,000 mcg by mouth daily.  traZODone (DESYREL) 50 mg tablet TAKE 1 TABLET BY MOUTH EACH NIGHT 30 Tab 1    conjugated estrogens (PREMARIN) 0.625 mg/gram vaginal cream Insert 0.5 g into vagina every other day.  acetaminophen (TYLENOL) 325 mg tablet Take  by mouth every four (4) hours as needed for Pain.  fish oil-omega-3 fatty acids (FISH OIL) 340-1,000 mg capsule Take 1 Cap by mouth daily.  ascorbic acid (VITAMIN C) 500 mg tablet Take 500 mg by mouth daily.  docusate sodium (COLACE) 100 mg capsule Take 100 mg by mouth nightly. Allergies   Allergen Reactions    Penicillins Swelling    Prednisone Other (comments)     Altered mental status.             SOCIAL HISTORY:     Social History   Substance Use Topics    Smoking status: Never Smoker    Smokeless tobacco: Never Used    Alcohol use No         FAMILY HISTORY:     Family History   Problem Relation Age of Onset    Heart Disease Brother      irregular heart beat            REVIEW OF SYMPTOMS:          Review of Symptoms:  Constitutional: Negative for fever, chills  HEENT: Negative for vision changes.    Respiratory: Negative for cough  Cardiovascular: Negative for orthopnea, syncope, and PND.    Gastrointestinal: Negative for abdominal pain, diarrhea, or melena  Genitourinary: Negative for dysuria  Musculoskeletal: Negative for myalgias. + back pain    Skin: Negative for rash  Heme: No major problems bleeding. Neurological: Negative for speech change and focal weakness. + doesn't sleep well           PHYSICAL EXAM:          Physical Exam:  Visit Vitals    /76 (BP 1 Location: Left arm, BP Patient Position: Sitting)    Pulse 73    Resp 16    Ht 5' 5\" (1.651 m)    Wt 112 lb 6.4 oz (51 kg)    SpO2 97%    BMI 18.7 kg/m2       Patient appears generally well, mood and affect are appropriate and pleasant. HEENT: Normocephalic, atraumatic. [place] anicteric. Hearing intact.    Neck Exam: Supple, no bruits   Lung Exam: Clear to auscultation, even breath sounds.    Cardiac Exam: RRR with no murmur  Abdomen: Soft, non-tender, no bruits   Extremities: No lower extremity edema.  MAW  Vascular: 2+ DP's  Psych: Appropriate affect  Neuro - non focal           LABS / OTHER STUDIES:                Labs 7/25/15 - Dig level 0.6  Labs 9/13/16 - CMP OK, chol 186, , HDL 77, LDL 77  Labs 5/12/17 - CMP OK, chol 244, , HDL 76, , TSH 1.4   Labs 11/7/17 - CMP OK, chol 254, , HDL 82,         CARDIAC DIAGNOSTICS:          Cardiac Evaluation Includes:  Echo 6/17/15 - LVEF 55-60%, normal atrial sizes  Lexiscan Cardiolite 6/18/15 - normal perfusion, LVEF 77%  Loop Monitor 4/11/16-5/10/16 - no afib       EKG 6/17/15 - Afib with RVR, rate 124 (visualized by me)  EKG 6/17/15 - NSR, normal (visualized by me)  EKG 7/11/16 - Afib with RVR, rate 130, NSST changes ---> later converted to NSR, NSST changes    EKG 7/22/16 - NSR, PAC, non-specific T wave abn   EKG 7/22/16 - NSR, PAC, non-specific T wave abn, low voltage               ASSESSMENT AND PLAN:          Assessment and Plan:  1) PAF    - On 6/17/15 she presented in Afib with RVR;. She spontaneously converted to NSR.    - On 7/11/16 she was seen with afib with RVR in [facility]. Converted to NSR on Diltiazem gtt. - She notes a couple of other episodes of PAF  - she is tolerating Cardizem for the most part. She has chronic fatigue that is likely non-cardiac related. She had fatigue from metoprolol. She says she took Digoxin once and did not like the way it made her feel (was a long time ago).    - On 7/16 - Increased Cardizem to 180 mg daily. If more PAF with RVR, will refer to EP to consider further adjusting medications vs ablation (Afib vs AV Node ablation + Pacemaker). - Continue Eliquis 2.5 mg BID ([id number] score is 3) - no major problems bleeding   - She is doing OK on 5/4/17 and 11/16/17 - continue current regimen       2) Dyslipidemia   - I told her she can stop pravastatin if she wants   - lipids high as expected      3) Depression   - goes to grief cancer   - she is looking better       4) See me back in 6 months. Patient expressed understanding of the plan - questions were answered.       She lost her  of 76 years in March 2015. She has 2 daughters - lives with her son. [place] lived [place] a farm.            Mary Garcia MD, [address]  Ph: [phone] Ph [phone]        ADDENDUM   5/18/2018  Labs 5/9/18 - Cr 1.22 otherwise CMP OK, chol 235, , HDL 63, , TSH 1.7

## 2017-11-16 NOTE — PROGRESS NOTES
Ricco Mello is a 80 y.o. female  Chief Complaint   Patient presents with    Irregular Heart Beat     Visit Vitals    /76 (BP 1 Location: Left arm, BP Patient Position: Sitting)    Pulse 73    Resp 16    Ht 5' 5\" (1.651 m)    Wt 112 lb 6.4 oz (51 kg)    SpO2 97%    BMI 18.7 kg/m2

## 2018-10-09 ENCOUNTER — TELEPHONE (OUTPATIENT)
Dept: CARDIOLOGY CLINIC | Age: 83
End: 2018-10-09

## 2018-10-09 NOTE — TELEPHONE ENCOUNTER
Patient states she has been taking eliquis and she wanted to see if she needs to continue this?   Phone: [phone]

## 2018-11-20 RX ORDER — DILTIAZEM HYDROCHLORIDE 180 MG/1
CAPSULE, COATED, EXTENDED RELEASE ORAL
Qty: 30 CAP | Refills: 3 | Status: SHIPPED | OUTPATIENT
Start: 2018-11-20 | End: 2019-03-16 | Stop reason: SDUPTHER

## 2019-01-18 ENCOUNTER — OFFICE VISIT (OUTPATIENT)
Dept: CARDIOLOGY CLINIC | Age: 84
End: 2019-01-18

## 2019-01-18 VITALS
DIASTOLIC BLOOD PRESSURE: 68 MMHG | BODY MASS INDEX: 18.33 KG/M2 | SYSTOLIC BLOOD PRESSURE: 118 MMHG | RESPIRATION RATE: 16 BRPM | HEIGHT: 65 IN | HEART RATE: 62 BPM | WEIGHT: 110 LBS

## 2019-01-18 DIAGNOSIS — E78.00 PURE HYPERCHOLESTEROLEMIA: ICD-10-CM

## 2019-01-18 DIAGNOSIS — I48.91 ATRIAL FIBRILLATION, UNSPECIFIED TYPE (HCC): Primary | ICD-10-CM

## 2019-01-18 RX ORDER — BACLOFEN 20 MG
TABLET ORAL DAILY
COMMUNITY
End: 2020-12-16

## 2019-01-18 RX ORDER — PHENOL/SODIUM PHENOLATE
20 AEROSOL, SPRAY (ML) MUCOUS MEMBRANE
COMMUNITY

## 2019-01-18 NOTE — PROGRESS NOTES
Sharan Severs, MD. [facility] [place]              Patient: Sudha Weir  : 1928      Today's Date: 2019            HISTORY OF PRESENT ILLNESS:     History of Present Illness:  Here for follow-up. Doing well overall. Brother passed a few weeks ago - age 80. Doing well health wise. No cardiac complaints. No CP or syncope or heart racing. PAST MEDICAL HISTORY:     Past Medical History:   Diagnosis Date    Acid reflux     Depression     Hypercholesterolemia     PAF (paroxysmal atrial fibrillation) ([address]     On 6/17/15 she presented in Afib with RVR; spontaneously converted to NSR. Also Afib with RVR on 16.  Uterine prolapse        Past Surgical History:   Procedure Laterality Date    HX APPENDECTOMY      HX CHOLECYSTECTOMY           MEDICATIONS:     Current Outpatient Medications   Medication Sig Dispense Refill    Omeprazole delayed release (PRILOSEC D/R) 20 mg tablet Take 20 mg by mouth daily.  calcium-cholecalciferol, d3, (CALCIUM 600 + D) 600-125 mg-unit tab Take  by mouth daily.  magnesium oxide 500 mg tab Take  by mouth daily.  escitalopram oxalate (LEXAPRO PO) Take  by mouth daily.  dilTIAZem CD (CARDIZEM CD) 180 mg ER capsule TAKE 1 CAPSULE BY MOUTH ONCE DAILY 30 Cap 3    apixaban (ELIQUIS) 2.5 mg tablet TAKE 1 TABLET BY MOUTH TWO  TIMES DAILY 180 Tab 2    cyanocobalamin 1,000 mcg tablet Take 1,000 mcg by mouth daily.  acetaminophen (TYLENOL) 325 mg tablet Take  by mouth every four (4) hours as needed for Pain.  fish oil-omega-3 fatty acids (FISH OIL) 340-1,000 mg capsule Take 1 Cap by mouth daily.  ascorbic acid (VITAMIN C) 500 mg tablet Take 1,000 mg by mouth daily. Allergies   Allergen Reactions    Penicillins Swelling    Prednisone Other (comments)     Altered mental status.             SOCIAL HISTORY:     Social History     Tobacco Use    Smoking status: Never Smoker    Smokeless tobacco: Never Used   Substance Use Topics    Alcohol use: No     Alcohol/week: 0.0 oz    Drug use: No         FAMILY HISTORY:     Family History   Problem Relation Age of Onset    Heart Disease Brother         irregular heart beat              REVIEW OF SYMPTOMS:          Review of Symptoms:  Constitutional: Negative for fever, chills  HEENT: Negative for vision changes.    Respiratory: Negative for cough  Cardiovascular: Negative for orthopnea, syncope, and PND.    Gastrointestinal: Negative for abdominal pain, diarrhea, or melena  Genitourinary: Negative for dysuria  Musculoskeletal: Negative for myalgias. + back pain    Skin: Negative for rash  Heme: No major problems bleeding. Neurological: Negative for speech change and focal weakness. + doesn't sleep well           PHYSICAL EXAM:          Physical Exam:  Visit Vitals  /68 (BP 1 Location: Left arm)   Pulse 62   Resp 16   Ht 5' 5\" (1.651 m)   Wt 110 lb (49.9 kg)   BMI 18.30 kg/m²         Patient appears generally well, mood and affect are appropriate and pleasant. HEENT: Normocephalic, atraumatic. Marcelo Dura anicteric. Hearing intact.    Neck Exam: Supple, no bruits   Lung Exam: Clear to auscultation, even breath sounds.    Cardiac Exam: RRR with no murmur  Abdomen: Soft, non-tender, no bruits   Extremities: No lower extremity edema.  MAW  Psych: Appropriate affect  Neuro - non focal           LABS / OTHER STUDIES:                Labs 7/25/15 - Dig level 0.6  Labs 9/13/16 - CMP OK, chol 186, , HDL 77, LDL 77  Labs 5/12/17 - CMP OK, chol 244, , HDL 76, , TSH 1.4   Labs 11/7/17 - CMP OK, chol 254, , HDL 82,   Labs 5/9/18 - Cr 1.22 otherwise CMP OK, chol 235, , HDL 63, , TSH 1.7         CARDIAC DIAGNOSTICS:          Cardiac Evaluation Includes:  Echo 6/17/15 - LVEF 55-60%, normal atrial sizes  Lexiscan Cardiolite 6/18/15 - normal perfusion, LVEF 77%  Loop Monitor 4/11/16-5/10/16 - no afib       EKG 6/17/15 - Afib with RVR, rate 124 (visualized by me)  EKG 6/17/15 - NSR, normal (visualized by me)  EKG 7/11/16 - Afib with RVR, rate 130, NSST changes ---> later converted to NSR, NSST changes    EKG 7/22/16 - NSR, PAC, non-specific T wave abn   EKG 7/22/16 - NSR, PAC, non-specific T wave abn, low voltage   EKG 1/18/19 - NSR, PRWP, low voltage             ASSESSMENT AND PLAN:          Assessment and Plan:  1) PAF    - On 6/17/15 she presented in Afib with RVR;. She spontaneously converted to NSR.    - On 7/11/16 she was seen with afib with RVR in [facility]. Converted to NSR on Diltiazem gtt. - She notes a couple of other episodes of PAF  - she is tolerating Cardizem for the most part. She has chronic fatigue that is likely non-cardiac related. She had fatigue from metoprolol. She says she took Digoxin once and did not like the way it made her feel (was a long time ago).    - On 7/16 - Increased Cardizem to 180 mg daily. If more PAF with RVR, will refer to EP to consider further adjusting medications vs ablation (Afib vs AV Node ablation + Pacemaker). - Continue Eliquis 2.5 mg BID ([id number] score is 3) - no major problems bleeding   - She is doing well on 1/18/19 - continue current regimen       2) Dyslipidemia   - I told her she can stop pravastatin if she wants given lack of benefit in elderly      3) Depression   - goes to grief cancer   - she is looking better       4) See me back in 12 months.  Patient expressed understanding of the plan - questions were answered.       She lost her  of 76 years in March 2015.  She has 2 daughters - lives with her son. Sahra [place]on lived on a farm.              Neal Ferrera MD, [address], [address]  [address] [place] [address].  [address], [address] [place] [address]  Ph: [phone]   Ph [phone]

## 2019-01-18 NOTE — PROGRESS NOTES
Visit Vitals  /68 (BP 1 Location: Left arm)   Pulse 62   Resp 16   Ht 5' 5\" (1.651 m)   Wt 110 lb (49.9 kg)   BMI 18.30 kg/m²       Patient is here for follow up. Doing well. No complaints.

## 2019-03-17 RX ORDER — DILTIAZEM HYDROCHLORIDE 180 MG/1
CAPSULE, COATED, EXTENDED RELEASE ORAL
Qty: 30 CAP | Refills: 3 | Status: SHIPPED | OUTPATIENT
Start: 2019-03-17 | End: 2019-07-09 | Stop reason: SDUPTHER

## 2019-04-04 NOTE — TELEPHONE ENCOUNTER
Pharmacy confirmed  Pt also asked if she can take [address] a natural herbal thing. She stated her daughter is wanting her to take this for depression.   Phone #[phone]  Thanks

## 2019-07-10 RX ORDER — DILTIAZEM HYDROCHLORIDE 180 MG/1
CAPSULE, COATED, EXTENDED RELEASE ORAL
Qty: 90 CAP | Refills: 3 | Status: SHIPPED | OUTPATIENT
Start: 2019-07-10 | End: 2020-03-16

## 2019-07-10 NOTE — TELEPHONE ENCOUNTER
Refill for diltiazem 180 mg daily per Dr. Kenyetta Guido VO.     LOV 1/18/19  NOV needed \"in 1 year\"

## 2019-08-30 NOTE — TELEPHONE ENCOUNTER
Refill of Eliquis 2.5 mg BID completed per VO of Dr. Cheryl Valdez.     Last OV: 1/2018  Next OV: 1/2019

## 2020-01-30 ENCOUNTER — OFFICE VISIT (OUTPATIENT)
Dept: CARDIOLOGY CLINIC | Age: 85
End: 2020-01-30

## 2020-01-30 VITALS
OXYGEN SATURATION: 98 % | DIASTOLIC BLOOD PRESSURE: 58 MMHG | SYSTOLIC BLOOD PRESSURE: 128 MMHG | HEART RATE: 75 BPM | WEIGHT: 108 LBS | BODY MASS INDEX: 17.99 KG/M2 | HEIGHT: 65 IN

## 2020-01-30 DIAGNOSIS — I48.91 ATRIAL FIBRILLATION, UNSPECIFIED TYPE (HCC): Primary | ICD-10-CM

## 2020-01-30 NOTE — PROGRESS NOTES
Chief Complaint   Patient presents with    Irregular Heart Beat    Annual Exam     Visit Vitals  /58 (BP 1 Location: Left arm, BP Patient Position: Sitting)   Pulse 75   Ht 5' 5\" (1.651 m)   Wt 108 lb (49 kg)   SpO2 98%   BMI 17.97 kg/m²         EKG performed

## 2020-01-30 NOTE — PROGRESS NOTES
Stephane Forbes MD. [facility]              Patient: Graig Meigs  : 1928      Today's Date: 2020          HISTORY OF PRESENT ILLNESS:     History of Present Illness:  She is here for follow-up. Drags around sometimes. Feels worse in Am. Depression chronic problem. No Cp or SOB. No dizziness. No syncope. She fell in October while walking across street and fell - maybe foot caught in something - did not pass out. PAST MEDICAL HISTORY:     Past Medical History:   Diagnosis Date    Acid reflux     Depression     Hypercholesterolemia     PAF (paroxysmal atrial fibrillation) ([address]     On 6/17/15 she presented in Afib with RVR; spontaneously converted to NSR. Also Afib with RVR on 16.  Uterine prolapse        Past Surgical History:   Procedure Laterality Date    HX APPENDECTOMY      HX CHOLECYSTECTOMY             MEDICATIONS:     Current Outpatient Medications   Medication Sig Dispense Refill    apixaban (ELIQUIS) 2.5 mg tablet TAKE 1 TABLET BY MOUTH TWO  TIMES DAILY 180 Tab 2    dilTIAZem CD (CARDIZEM CD) 180 mg ER capsule TAKE 1 CAPSULE BY MOUTH ONCE DAILY 90 Cap 3    Omeprazole delayed release (PRILOSEC D/R) 20 mg tablet Take 20 mg by mouth daily.  calcium-cholecalciferol, d3, (CALCIUM 600 + D) 600-125 mg-unit tab Take  by mouth daily.  magnesium oxide 500 mg tab Take  by mouth daily.  escitalopram oxalate (LEXAPRO PO) Take  by mouth daily.  cyanocobalamin 1,000 mcg tablet Take 1,000 mcg by mouth daily.  acetaminophen (TYLENOL) 325 mg tablet Take  by mouth every four (4) hours as needed for Pain.  fish oil-omega-3 fatty acids (FISH OIL) 340-1,000 mg capsule Take 1 Cap by mouth daily.  ascorbic acid (VITAMIN C) 500 mg tablet Take 1,000 mg by mouth daily. Allergies   Allergen Reactions    Penicillins Swelling    Prednisone Other (comments)     Altered mental status.               SOCIAL HISTORY:     Social History Tobacco Use    Smoking status: Never Smoker    Smokeless tobacco: Never Used   Substance Use Topics    Alcohol use: No     Alcohol/week: 0.0 standard drinks    Drug use: No         FAMILY HISTORY:     Family History   Problem Relation Age of Onset    Heart Disease Brother         irregular heart beat               REVIEW OF SYMPTOMS:          Review of Symptoms:  Constitutional: Negative for fever, chills  HEENT: Negative for vision changes.    Respiratory: Negative for cough  Cardiovascular: Negative for orthopnea, syncope, and PND.    Gastrointestinal: Negative for abdominal pain, diarrhea, or melena  Genitourinary: Negative for dysuria  Musculoskeletal: Negative for myalgias. + back pain    Skin: Negative for rash  Heme: No major problems bleeding. Neurological: Negative for speech change and focal weakness.   + doesn't sleep well, depression           PHYSICAL EXAM:          Physical Exam:  Visit Vitals  /58 (BP 1 Location: Left arm, BP Patient Position: Sitting)   Pulse 75   Ht 5' 5\" (1.651 m)   Wt 108 lb (49 kg)   SpO2 98%   BMI 17.97 kg/m²          Patient appears generally well, mood and affect are appropriate and pleasant. Frail. HEENT: Normocephalic, atraumatic. Delona Taos anicteric. Hearing intact.    Neck Exam: Supple, no bruits   Lung Exam: Clear to auscultation, even breath sounds.    Cardiac Exam: RRR with no murmur  Abdomen: Soft, non-tender, no bruits   Extremities: No lower extremity edema.  MAW  Vascular: 2+ DP's   Psych: Appropriate affect  Neuro - non focal           LABS / OTHER STUDIES:                Labs 7/25/15 - Dig level 0.6  Labs 9/13/16 - CMP OK, chol 186, , HDL 77, LDL 77  Labs 5/12/17 - CMP OK, chol 244, , HDL 76, , TSH 1.4   Labs 11/7/17 - CMP OK, chol 254, , HDL 82,   Labs 5/9/18 - Cr 1.22 otherwise CMP OK, chol 235, , HDL 63, , TSH 1.7         CARDIAC DIAGNOSTICS:          Cardiac Evaluation Includes:  Echo 6/17/15 - LVEF 55-60%, normal atrial sizes  Lexiscan Cardiolite 6/18/15 - normal perfusion, LVEF 77%  Loop Monitor 4/11/16-5/10/16 - no afib       EKG 6/17/15 - Afib with RVR, rate 124 (visualized by me)  EKG 6/17/15 - NSR, normal (visualized by me)  EKG 7/11/16 - Afib with RVR, rate 130, NSST changes ---> later converted to NSR, NSST changes    EKG 7/22/16 - NSR, PAC, non-specific T wave abn   EKG 7/22/16 - NSR, PAC, non-specific T wave abn, low voltage   EKG 1/18/19 - NSR, PRWP, low voltage             ASSESSMENT AND PLAN:          Assessment and Plan:  1) PAF    - On 6/17/15 she presented in Afib with RVR;. She spontaneously converted to NSR.    - On 7/11/16 she was seen with afib with RVR in [facility]. Converted to NSR on Diltiazem gtt. - She notes a couple of other episodes of PAF  - she is tolerating Cardizem for the most part. She has chronic fatigue that is likely non-cardiac related. She had fatigue from metoprolol. She says she took Digoxin once and did not like the way it made her feel (was a long time ago).    - On 7/16 - Increased Cardizem to 180 mg daily. If more PAF with RVR, will refer to EP to consider further adjusting medications vs ablation (Afib vs AV Node ablation + Pacemaker).  - Continue Eliquis 2.5 mg BID ([id number] score is 3) - no major problems bleeding   - She is doing well on 1/18/19 and 1/30/20  - continue current regimen       2) Dyslipidemia   - I told her she can stop statin given lack of benefit in elderly      3) Depression   - goes to grief counseling  - she is looking better       4) See me back in 12 months.  Patient expressed understanding of the plan - questions were answered.       She lost her  of 76 years in March 2015.  She has 2 daughters - lives with her son. Tara Benjamin lived on a farm.               Piedad Del Cid MD, [facility], [phone] Britney Hernandez.  [address], [place] [address] [place] [address]  Ph: [phone]                               Ph [phone]

## 2020-03-16 RX ORDER — DILTIAZEM HYDROCHLORIDE 180 MG/1
CAPSULE, COATED, EXTENDED RELEASE ORAL
Qty: 90 CAP | Refills: 3 | Status: SHIPPED | OUTPATIENT
Start: 2020-03-16 | End: 2020-08-24 | Stop reason: SDUPTHER

## 2020-03-16 NOTE — TELEPHONE ENCOUNTER
Per Dr. Nora Hu VO:  VCD:3/11/4879  Future Appointments   Date Time Provider Carlos Mcconnell   2/4/2021  1:00 PM Mallory Padilla MD [facility]     Requested Prescriptions     Pending Prescriptions Disp Refills    dilTIAZem CD (CARDIZEM CD) 180 mg ER capsule [Pharmacy Med Name: DILTIAZEM 24HR  MG  CAP] 90 Cap 3     Sig: TAKE 1 CAPSULE BY MOUTH ONCE DAILY

## 2020-05-19 NOTE — TELEPHONE ENCOUNTER
Per Dr. Judy Medina VO:  VYQ:6/24/2108  Future Appointments   Date Time Provider Carlos Enedina   2/4/2021  1:00 PM Kristen Nichols MD [facility]     Requested Prescriptions     Pending Prescriptions Disp Refills    apixaban (Eliquis) 2.5 mg tablet 180 Tab 3     Sig: TAKE 1 TABLET BY MOUTH TWO  TIMES DAILY     [place] (daughter)'s cell: [phone]

## 2020-09-09 ENCOUNTER — TELEPHONE (OUTPATIENT)
Dept: CARDIOLOGY CLINIC | Age: 85
End: 2020-09-09

## 2020-09-09 NOTE — TELEPHONE ENCOUNTER
Pt's daughter(Katie) inquiring about a co-pay card for eliquis. Daughter states the quantity supplied of the medication is needed.  Please advise        [facility]:[phone]

## 2020-09-09 NOTE — TELEPHONE ENCOUNTER
Spoke to pt's daughter, explained about the donut hole. Recommended her to go to webpage [phone] and download application. All questions answered.

## 2020-12-16 ENCOUNTER — HOSPITAL ENCOUNTER (INPATIENT)
Age: 85
LOS: 3 days | Discharge: HOME HEALTH | DRG: 872 | End: 2020-12-19
Attending: EMERGENCY MEDICINE | Admitting: INTERNAL MEDICINE
Payer: MEDICARE

## 2020-12-16 ENCOUNTER — APPOINTMENT (OUTPATIENT)
Dept: GENERAL RADIOLOGY | Age: 85
DRG: 872 | End: 2020-12-16
Attending: EMERGENCY MEDICINE
Payer: MEDICARE

## 2020-12-16 DIAGNOSIS — A41.9 SEPSIS WITHOUT ACUTE ORGAN DYSFUNCTION, DUE TO UNSPECIFIED ORGANISM (HCC): Primary | ICD-10-CM

## 2020-12-16 DIAGNOSIS — N30.00 ACUTE CYSTITIS WITHOUT HEMATURIA: ICD-10-CM

## 2020-12-16 DIAGNOSIS — N12 PYELONEPHRITIS: ICD-10-CM

## 2020-12-16 PROBLEM — R65.20 SEVERE SEPSIS (HCC): Status: ACTIVE | Noted: 2020-12-16

## 2020-12-16 LAB
ALBUMIN SERPL-MCNC: 3.9 G/DL (ref 3.5–5)
ALBUMIN/GLOB SERPL: 1.1 {RATIO} (ref 1.1–2.2)
ALP SERPL-CCNC: 93 U/L (ref 45–117)
ALT SERPL-CCNC: 17 U/L (ref 12–78)
ANION GAP SERPL CALC-SCNC: 12 MMOL/L (ref 5–15)
APPEARANCE UR: ABNORMAL
AST SERPL-CCNC: 16 U/L (ref 15–37)
BACTERIA URNS QL MICRO: ABNORMAL /HPF
BASOPHILS # BLD: 0.1 K/UL (ref 0–0.1)
BASOPHILS NFR BLD: 0 % (ref 0–1)
BILIRUB SERPL-MCNC: 0.5 MG/DL (ref 0.2–1)
BILIRUB UR QL: NEGATIVE
BUN SERPL-MCNC: 23 MG/DL (ref 6–20)
BUN/CREAT SERPL: 19 (ref 12–20)
CALCIUM SERPL-MCNC: 9.6 MG/DL (ref 8.5–10.1)
CHLORIDE SERPL-SCNC: 100 MMOL/L (ref 97–108)
CO2 SERPL-SCNC: 25 MMOL/L (ref 21–32)
COLOR UR: ABNORMAL
COMMENT, HOLDF: NORMAL
CREAT SERPL-MCNC: 1.2 MG/DL (ref 0.55–1.02)
DIFFERENTIAL METHOD BLD: ABNORMAL
EOSINOPHIL # BLD: 0.1 K/UL (ref 0–0.4)
EOSINOPHIL NFR BLD: 0 % (ref 0–7)
EPITH CASTS URNS QL MICRO: ABNORMAL /LPF
ERYTHROCYTE [DISTWIDTH] IN BLOOD BY AUTOMATED COUNT: 12.6 % (ref 11.5–14.5)
GLOBULIN SER CALC-MCNC: 3.6 G/DL (ref 2–4)
GLUCOSE SERPL-MCNC: 139 MG/DL (ref 65–100)
GLUCOSE UR STRIP.AUTO-MCNC: NEGATIVE MG/DL
HCT VFR BLD AUTO: 43 % (ref 35–47)
HGB BLD-MCNC: 14.5 G/DL (ref 11.5–16)
HGB UR QL STRIP: NEGATIVE
HYALINE CASTS URNS QL MICRO: ABNORMAL /LPF (ref 0–5)
IMM GRANULOCYTES # BLD AUTO: 0.1 K/UL (ref 0–0.04)
IMM GRANULOCYTES NFR BLD AUTO: 1 % (ref 0–0.5)
KETONES UR QL STRIP.AUTO: 40 MG/DL
LACTATE SERPL-SCNC: 0.8 MMOL/L (ref 0.4–2)
LACTATE SERPL-SCNC: 6.3 MMOL/L (ref 0.4–2)
LEUKOCYTE ESTERASE UR QL STRIP.AUTO: ABNORMAL
LYMPHOCYTES # BLD: 2 K/UL (ref 0.8–3.5)
LYMPHOCYTES NFR BLD: 17 % (ref 12–49)
MAGNESIUM SERPL-MCNC: 2.1 MG/DL (ref 1.6–2.4)
MCH RBC QN AUTO: 29.9 PG (ref 26–34)
MCHC RBC AUTO-ENTMCNC: 33.7 G/DL (ref 30–36.5)
MCV RBC AUTO: 88.7 FL (ref 80–99)
MONOCYTES # BLD: 1.1 K/UL (ref 0–1)
MONOCYTES NFR BLD: 9 % (ref 5–13)
NEUTS SEG # BLD: 8.6 K/UL (ref 1.8–8)
NEUTS SEG NFR BLD: 73 % (ref 32–75)
NITRITE UR QL STRIP.AUTO: NEGATIVE
NRBC # BLD: 0 K/UL (ref 0–0.01)
NRBC BLD-RTO: 0 PER 100 WBC
PH UR STRIP: 8 [PH] (ref 5–8)
PLATELET # BLD AUTO: 370 K/UL (ref 150–400)
PMV BLD AUTO: 8.8 FL (ref 8.9–12.9)
POTASSIUM SERPL-SCNC: 3.3 MMOL/L (ref 3.5–5.1)
PROT SERPL-MCNC: 7.5 G/DL (ref 6.4–8.2)
PROT UR STRIP-MCNC: 30 MG/DL
RBC # BLD AUTO: 4.85 M/UL (ref 3.8–5.2)
RBC #/AREA URNS HPF: ABNORMAL /HPF (ref 0–5)
SAMPLES BEING HELD,HOLD: NORMAL
SODIUM SERPL-SCNC: 137 MMOL/L (ref 136–145)
SP GR UR REFRACTOMETRY: 1.02 (ref 1–1.03)
TROPONIN I SERPL-MCNC: <0.05 NG/ML
UR CULT HOLD, URHOLD: NORMAL
UROBILINOGEN UR QL STRIP.AUTO: 1 EU/DL (ref 0.2–1)
WBC # BLD AUTO: 11.9 K/UL (ref 3.6–11)
WBC URNS QL MICRO: ABNORMAL /HPF (ref 0–4)

## 2020-12-16 PROCEDURE — 81001 URINALYSIS AUTO W/SCOPE: CPT

## 2020-12-16 PROCEDURE — 51701 INSERT BLADDER CATHETER: CPT

## 2020-12-16 PROCEDURE — 96375 TX/PRO/DX INJ NEW DRUG ADDON: CPT

## 2020-12-16 PROCEDURE — 80053 COMPREHEN METABOLIC PANEL: CPT

## 2020-12-16 PROCEDURE — 93005 ELECTROCARDIOGRAM TRACING: CPT

## 2020-12-16 PROCEDURE — 84484 ASSAY OF TROPONIN QUANT: CPT

## 2020-12-16 PROCEDURE — 83605 ASSAY OF LACTIC ACID: CPT

## 2020-12-16 PROCEDURE — 87040 BLOOD CULTURE FOR BACTERIA: CPT

## 2020-12-16 PROCEDURE — 74011000250 HC RX REV CODE- 250: Performed by: EMERGENCY MEDICINE

## 2020-12-16 PROCEDURE — 96374 THER/PROPH/DIAG INJ IV PUSH: CPT

## 2020-12-16 PROCEDURE — 65660000000 HC RM CCU STEPDOWN

## 2020-12-16 PROCEDURE — 36415 COLL VENOUS BLD VENIPUNCTURE: CPT

## 2020-12-16 PROCEDURE — 71045 X-RAY EXAM CHEST 1 VIEW: CPT

## 2020-12-16 PROCEDURE — 87086 URINE CULTURE/COLONY COUNT: CPT

## 2020-12-16 PROCEDURE — 99285 EMERGENCY DEPT VISIT HI MDM: CPT

## 2020-12-16 PROCEDURE — 83735 ASSAY OF MAGNESIUM: CPT

## 2020-12-16 PROCEDURE — 87635 SARS-COV-2 COVID-19 AMP PRB: CPT

## 2020-12-16 PROCEDURE — 74011250636 HC RX REV CODE- 250/636: Performed by: EMERGENCY MEDICINE

## 2020-12-16 PROCEDURE — 87186 SC STD MICRODIL/AGAR DIL: CPT

## 2020-12-16 PROCEDURE — 85025 COMPLETE CBC W/AUTO DIFF WBC: CPT

## 2020-12-16 PROCEDURE — 87077 CULTURE AEROBIC IDENTIFY: CPT

## 2020-12-16 PROCEDURE — 74011250637 HC RX REV CODE- 250/637: Performed by: STUDENT IN AN ORGANIZED HEALTH CARE EDUCATION/TRAINING PROGRAM

## 2020-12-16 RX ORDER — MELATONIN
1000 DAILY
COMMUNITY

## 2020-12-16 RX ORDER — HYDROCODONE BITARTRATE AND ACETAMINOPHEN 5; 325 MG/1; MG/1
1 TABLET ORAL
Status: DISCONTINUED | OUTPATIENT
Start: 2020-12-16 | End: 2020-12-19 | Stop reason: HOSPADM

## 2020-12-16 RX ORDER — MORPHINE SULFATE 4 MG/ML
2 INJECTION INTRAVENOUS ONCE
Status: COMPLETED | OUTPATIENT
Start: 2020-12-16 | End: 2020-12-16

## 2020-12-16 RX ORDER — POTASSIUM CHLORIDE 750 MG/1
40 TABLET, FILM COATED, EXTENDED RELEASE ORAL
Status: COMPLETED | OUTPATIENT
Start: 2020-12-16 | End: 2020-12-16

## 2020-12-16 RX ORDER — ACETAMINOPHEN 325 MG/1
650 TABLET ORAL
Status: DISCONTINUED | OUTPATIENT
Start: 2020-12-16 | End: 2020-12-19 | Stop reason: HOSPADM

## 2020-12-16 RX ORDER — SODIUM CHLORIDE 0.9 % (FLUSH) 0.9 %
5-10 SYRINGE (ML) INJECTION AS NEEDED
Status: DISCONTINUED | OUTPATIENT
Start: 2020-12-16 | End: 2020-12-19 | Stop reason: HOSPADM

## 2020-12-16 RX ORDER — NALOXONE HYDROCHLORIDE 0.4 MG/ML
0.4 INJECTION, SOLUTION INTRAMUSCULAR; INTRAVENOUS; SUBCUTANEOUS AS NEEDED
Status: DISCONTINUED | OUTPATIENT
Start: 2020-12-16 | End: 2020-12-19 | Stop reason: HOSPADM

## 2020-12-16 RX ORDER — BUPROPION HYDROCHLORIDE 150 MG/1
150 TABLET ORAL
COMMUNITY

## 2020-12-16 RX ORDER — ONDANSETRON 2 MG/ML
4 INJECTION INTRAMUSCULAR; INTRAVENOUS
Status: DISCONTINUED | OUTPATIENT
Start: 2020-12-16 | End: 2020-12-19 | Stop reason: HOSPADM

## 2020-12-16 RX ORDER — SODIUM CHLORIDE 0.9 % (FLUSH) 0.9 %
5-40 SYRINGE (ML) INJECTION EVERY 8 HOURS
Status: DISCONTINUED | OUTPATIENT
Start: 2020-12-16 | End: 2020-12-19 | Stop reason: HOSPADM

## 2020-12-16 RX ORDER — SODIUM CHLORIDE 0.9 % (FLUSH) 0.9 %
5-40 SYRINGE (ML) INJECTION AS NEEDED
Status: DISCONTINUED | OUTPATIENT
Start: 2020-12-16 | End: 2020-12-19 | Stop reason: HOSPADM

## 2020-12-16 RX ORDER — FLUOXETINE HYDROCHLORIDE 40 MG/1
40 CAPSULE ORAL DAILY
COMMUNITY

## 2020-12-16 RX ADMIN — SODIUM CHLORIDE 1000 ML: 900 INJECTION, SOLUTION INTRAVENOUS at 18:40

## 2020-12-16 RX ADMIN — POTASSIUM CHLORIDE 40 MEQ: 750 TABLET, FILM COATED, EXTENDED RELEASE ORAL at 20:43

## 2020-12-16 RX ADMIN — CEFEPIME HYDROCHLORIDE 2 G: 2 INJECTION, POWDER, FOR SOLUTION INTRAVENOUS at 18:41

## 2020-12-16 RX ADMIN — SODIUM CHLORIDE 470 ML: 900 INJECTION, SOLUTION INTRAVENOUS at 18:40

## 2020-12-16 RX ADMIN — MORPHINE SULFATE 2 MG: 4 INJECTION INTRAVENOUS at 17:58

## 2020-12-16 NOTE — PROGRESS NOTES
[facility] Dosing Services: 12/16/20  Cefepime dose change per renal P&T protocol  Physician: Dr Jas Norris    Indication: UTI  Previous Regimen Cefepime 2 gm IV q8hr x5 days   Serum Creatinine Lab Results   Component Value Date/Time    Creatinine 1.20 (H) 12/16/2020 05:32 PM    Creatinine (POC) 1.1 08/18/2013 06:32 PM      Creatinine Clearance Estimated Creatinine Clearance: 23.1 mL/min (A) (based on SCr of 1.2 mg/dL (H)).    BUN Lab Results   Component Value Date/Time    BUN 23 (H) 12/16/2020 05:32 PM    BUN (POC) 16 08/18/2013 06:32 PM         Plan: Changed Cefepime to 2 gm IV q24hr x5 days    Thank you  Steven Wren, PharmD  [phone]

## 2020-12-16 NOTE — ED TRIAGE NOTES
Pt arrives via EMS from [facility] facility w/ c/c of back pain. Pt hx of recent UTI. Pt became tachypneic & SOB on transport per EMS. No COVID + residents at facility per EMS.

## 2020-12-16 NOTE — ED NOTES
Pt's daughter @ bedside, updated on plan of care. Pt's pain appears improved, pt is no longer moaning/grimacing/restless. Pt resting comfortably at this time w/ bed exit alarm armed.

## 2020-12-16 NOTE — ED PROVIDER NOTES
80-year-old female with a history of depression and paroxysmal A. fib and recent UTI, reportedly on antibiotics, presents to the emergency department today with chief complaint of back pain. She is unsure when she was diagnosed with the UTI and what antibiotic she is on. During transport she developed shortness of breath in association of the back pain. The history is provided by the patient, medical records and the EMS personnel. Back Pain   This is a new problem. Episode onset: Today. The problem occurs constantly. Patient reports not work related injury. The pain is associated with no known injury. The pain is severe. Pertinent negatives include no chest pain, no fever, no headaches, no abdominal pain, no abdominal swelling and no weakness. Shortness of Breath  This is a new problem. The current episode started less than 1 hour ago. The problem has not changed since onset. Pertinent negatives include no fever, no headaches, no chest pain and no abdominal pain. Past Medical History:   Diagnosis Date    Acid reflux     Depression     Hypercholesterolemia     [address] ([address]oxysmal atrial [address]) [address] [address]     On 6/17/15 she presented in Afib with RVR; spontaneously converted to NSR. Also Afib with RVR on 7/11/16.       Uterine prolapse        Past Surgical History:   Procedure Laterality Date    HX APPENDECTOMY      HX CHOLECYSTECTOMY           Family History:   Problem Relation Age of Onset    Heart Disease Brother         irregular heart beat       Social History     Socioeconomic History    Marital status:      Spouse name: Not on file    Number of children: Not on file    Years of education: Not on file    Highest education level: Not on file   Occupational History    Not on file   Social Needs    Financial resource strain: Not on file    Food insecurity     Worry: Not on file     Inability: Not on file    Transportation needs     Medical: Not on file     Non-medical: Not on file Tobacco Use    Smoking status: Never Smoker    Smokeless tobacco: Never Used   Substance and Sexual Activity    Alcohol use: No     Alcohol/week: 0.0 standard drinks    Drug use: No    Sexual activity: Not on file   Lifestyle    Physical activity     Days per week: Not on file     Minutes per session: Not on file    Stress: Not on file   Relationships    Social connections     Talks on phone: Not on file     Gets together: Not on file     Attends Dru[employer] service: Not on file     Active member of club or organization: Not on file     Attends meetings of clubs or organizations: Not on file     Relationship status: Not on file    Intimate partner violence     Fear of current or ex partner: Not on file     Emotionally abused: Not on file     Physically abused: Not on file     Forced sexual activity: Not on file   Other Topics Concern    Not on file   Social History Narrative    Not on file         ALLERGIES: Penicillins and Prednisone    Review of Systems   Constitutional: Negative for fever. Respiratory: Positive for shortness of breath. Cardiovascular: Negative for chest pain. Gastrointestinal: Negative for abdominal pain. Musculoskeletal: Positive for back pain. Neurological: Negative for weakness and headaches. There were no vitals filed for this visit. Physical Exam  Vitals signs and nursing note reviewed. Constitutional:       General: She is not in acute distress. Appearance: She is well-developed and normal weight. She is not ill-appearing, toxic-appearing or diaphoretic. HENT:      Head: Normocephalic and atraumatic. Nose: Nose normal.      Mouth/Throat:      Mouth: Mucous membranes are moist.      Pharynx: Oropharynx is clear. Eyes:      Extraocular Movements: Extraocular movements intact. Conjunctiva/sclera: Conjunctivae normal.      Pupils: Pupils are equal, round, and reactive to light.    Neck:      Musculoskeletal: Normal range of motion and neck supple. Cardiovascular:      Rate and Rhythm: Normal rate and regular rhythm. Pulses: Normal pulses. Heart sounds: Normal heart sounds. Pulmonary:      Effort: Tachypnea and respiratory distress present. Breath sounds: Normal breath sounds. Abdominal:      General: There is no distension. Palpations: Abdomen is soft. There is no mass. Tenderness: There is no abdominal tenderness. There is right CVA tenderness. There is no left CVA tenderness, guarding or rebound. Musculoskeletal: Normal range of motion. General: No swelling, tenderness, deformity or signs of injury. Right lower leg: No edema. Left lower leg: No edema. Skin:     General: Skin is warm and dry. Capillary Refill: Capillary refill takes less than 2 seconds. Neurological:      General: No focal deficit present. Mental Status: She is alert and oriented to person, place, and time. Psychiatric:         Mood and Affect: Mood normal.         Behavior: Behavior normal.          MDM  Number of Diagnoses or Management Options  Acute cystitis without hematuria  Pyelonephritis  Sepsis without acute organ dysfunction, due to unspecified organism [facility])  Diagnosis management comments: 17-year-old female presents as above with back pain and continued pyuria in the setting of recent diagnosis of UTI. Her work-up is concerning for severe sepsis without septic shock. Fluids and antibiotics were started in the emergency department she will be admitted to the hospital for further management. Amount and/or Complexity of Data Reviewed  Clinical lab tests: reviewed  Tests in the radiology section of CPT®: reviewed  Decide to obtain previous medical records or to obtain history from someone other than the patient: yes           Procedures          Rhythm: normal sinus rhythm. Rate (approx.): 83. Axis: normal.  ST segment: Nonspecific ST/T wave abnormality. Prolonged QTC at 526.  This EKG was interpreted by Romeo Donovan MD,ED Provider. IMPRESSION:  No diagnosis found.    - Sepsis order set entered: YES  - Broad Spectrum Antibiotics given: Cefepime  - Repeat lactic acid ordered for time 1932  - Re-assessment performed at time 14 968 169 and clinical condition stable. - Actions taken: Antibiotics and fluids have been ordered. - Hypotension or Lactic Acidosis present (SBP<90, MAP<65, Lactate >4): YES IVF:  30cc/kg actual Body Weight  - Septic Shock present (Hypotension despite IVF resuscitation): NO  Vasopressors: Not indicated due to Septic Shock not present    Plan:  Admit to Step down    Total critical care time spent exclusive of procedures:  45 minutes    Petrona Bear MD      Repeat Sepsis focused physical exam at time 1825  Visit Vitals  BP (!) 144/77   Pulse 81   Temp 97.4 °F (36.3 °C)   Resp 24   Ht 5' 5\" (1.651 m)   Wt 49 kg (108 lb)   SpO2 96%   BMI 17.97 kg/m²         Cardiopulmonary exam  Regular rate and rhythm, normal cardiac and pulmonary auscultation, normal pulmonary exam    Capillary refill  Less than 2 seconds    Peripheral pulse evaluation  Strong and equal x4    Skin exam  Warm and dry        Perfect Serve Consult for Admission  6:24 PM    ED Room Number: ER17/17  Patient Name and age:  Eitan Wilkerson 80 y.o.  female  Working Diagnosis:   1. Sepsis without acute organ dysfunction, due to unspecified organism ([address]    2. Acute cystitis without hematuria    3.  Pyelonephritis        COVID-19 Suspicion:  no  Sepsis present:  no  Reassessment needed: no  Code Status:  Full Code  Readmission: no  Isolation Requirements:  no  Recommended Level of Care:  step down  Department:[facility] ED - [phone]  Other:  Negative covid in the ED, failure of outpatient treatment of UTI on unknown therapy

## 2020-12-17 LAB
ANION GAP SERPL CALC-SCNC: 1 MMOL/L (ref 5–15)
ATRIAL RATE: 83 BPM
BASOPHILS # BLD: 0 K/UL (ref 0–0.1)
BASOPHILS NFR BLD: 0 % (ref 0–1)
BUN SERPL-MCNC: 18 MG/DL (ref 6–20)
BUN/CREAT SERPL: 20 (ref 12–20)
CALCIUM SERPL-MCNC: 8.6 MG/DL (ref 8.5–10.1)
CALCULATED P AXIS, ECG09: 91 DEGREES
CALCULATED R AXIS, ECG10: -16 DEGREES
CALCULATED T AXIS, ECG11: 59 DEGREES
CHLORIDE SERPL-SCNC: 111 MMOL/L (ref 97–108)
CO2 SERPL-SCNC: 28 MMOL/L (ref 21–32)
CREAT SERPL-MCNC: 0.9 MG/DL (ref 0.55–1.02)
DIAGNOSIS, 93000: NORMAL
DIFFERENTIAL METHOD BLD: ABNORMAL
EOSINOPHIL # BLD: 0.1 K/UL (ref 0–0.4)
EOSINOPHIL NFR BLD: 1 % (ref 0–7)
ERYTHROCYTE [DISTWIDTH] IN BLOOD BY AUTOMATED COUNT: 12.9 % (ref 11.5–14.5)
GLUCOSE SERPL-MCNC: 95 MG/DL (ref 65–100)
HCT VFR BLD AUTO: 32.8 % (ref 35–47)
HEALTH STATUS, XMCV2T: NORMAL
HGB BLD-MCNC: 10.9 G/DL (ref 11.5–16)
IMM GRANULOCYTES # BLD AUTO: 0.1 K/UL (ref 0–0.04)
IMM GRANULOCYTES NFR BLD AUTO: 0 % (ref 0–0.5)
LYMPHOCYTES # BLD: 1.8 K/UL (ref 0.8–3.5)
LYMPHOCYTES NFR BLD: 20 % (ref 12–49)
MAGNESIUM SERPL-MCNC: 2.2 MG/DL (ref 1.6–2.4)
MCH RBC QN AUTO: 30.2 PG (ref 26–34)
MCHC RBC AUTO-ENTMCNC: 33.2 G/DL (ref 30–36.5)
MCV RBC AUTO: 90.9 FL (ref 80–99)
MONOCYTES # BLD: 1.1 K/UL (ref 0–1)
MONOCYTES NFR BLD: 12 % (ref 5–13)
NEUTS SEG # BLD: 6.1 K/UL (ref 1.8–8)
NEUTS SEG NFR BLD: 67 % (ref 32–75)
NRBC # BLD: 0 K/UL (ref 0–0.01)
NRBC BLD-RTO: 0 PER 100 WBC
P-R INTERVAL, ECG05: 134 MS
PLATELET # BLD AUTO: 267 K/UL (ref 150–400)
PMV BLD AUTO: 8.9 FL (ref 8.9–12.9)
POTASSIUM SERPL-SCNC: 3.8 MMOL/L (ref 3.5–5.1)
Q-T INTERVAL, ECG07: 448 MS
QRS DURATION, ECG06: 92 MS
QTC CALCULATION (BEZET), ECG08: 526 MS
RBC # BLD AUTO: 3.61 M/UL (ref 3.8–5.2)
SARS-COV-2, COV2: NOT DETECTED
SODIUM SERPL-SCNC: 140 MMOL/L (ref 136–145)
SOURCE, COVRS: NORMAL
SPECIMEN SOURCE, FCOV2M: NORMAL
SPECIMEN TYPE, XMCV1T: NORMAL
VENTRICULAR RATE, ECG03: 83 BPM
WBC # BLD AUTO: 9.1 K/UL (ref 3.6–11)

## 2020-12-17 PROCEDURE — 36415 COLL VENOUS BLD VENIPUNCTURE: CPT

## 2020-12-17 PROCEDURE — 97530 THERAPEUTIC ACTIVITIES: CPT

## 2020-12-17 PROCEDURE — 2709999900 HC NON-CHARGEABLE SUPPLY

## 2020-12-17 PROCEDURE — 74011000250 HC RX REV CODE- 250: Performed by: INTERNAL MEDICINE

## 2020-12-17 PROCEDURE — 74011250636 HC RX REV CODE- 250/636: Performed by: STUDENT IN AN ORGANIZED HEALTH CARE EDUCATION/TRAINING PROGRAM

## 2020-12-17 PROCEDURE — 65660000000 HC RM CCU STEPDOWN

## 2020-12-17 PROCEDURE — 74011250637 HC RX REV CODE- 250/637: Performed by: STUDENT IN AN ORGANIZED HEALTH CARE EDUCATION/TRAINING PROGRAM

## 2020-12-17 PROCEDURE — 80048 BASIC METABOLIC PNL TOTAL CA: CPT

## 2020-12-17 PROCEDURE — 85025 COMPLETE CBC W/AUTO DIFF WBC: CPT

## 2020-12-17 PROCEDURE — 97165 OT EVAL LOW COMPLEX 30 MIN: CPT

## 2020-12-17 PROCEDURE — 74011250636 HC RX REV CODE- 250/636: Performed by: INTERNAL MEDICINE

## 2020-12-17 PROCEDURE — 83735 ASSAY OF MAGNESIUM: CPT

## 2020-12-17 PROCEDURE — 97535 SELF CARE MNGMENT TRAINING: CPT

## 2020-12-17 PROCEDURE — 97162 PT EVAL MOD COMPLEX 30 MIN: CPT

## 2020-12-17 RX ORDER — PANTOPRAZOLE SODIUM 40 MG/1
40 TABLET, DELAYED RELEASE ORAL
Status: DISCONTINUED | OUTPATIENT
Start: 2020-12-17 | End: 2020-12-18

## 2020-12-17 RX ORDER — POLYETHYLENE GLYCOL 3350 17 G/17G
17 POWDER, FOR SOLUTION ORAL DAILY PRN
Status: DISCONTINUED | OUTPATIENT
Start: 2020-12-17 | End: 2020-12-19 | Stop reason: HOSPADM

## 2020-12-17 RX ORDER — FLUOXETINE HYDROCHLORIDE 20 MG/1
40 CAPSULE ORAL DAILY
Status: DISCONTINUED | OUTPATIENT
Start: 2020-12-17 | End: 2020-12-19 | Stop reason: HOSPADM

## 2020-12-17 RX ORDER — SODIUM CHLORIDE 9 MG/ML
75 INJECTION, SOLUTION INTRAVENOUS CONTINUOUS
Status: DISCONTINUED | OUTPATIENT
Start: 2020-12-17 | End: 2020-12-18

## 2020-12-17 RX ORDER — SODIUM CHLORIDE 0.9 % (FLUSH) 0.9 %
5-40 SYRINGE (ML) INJECTION AS NEEDED
Status: DISCONTINUED | OUTPATIENT
Start: 2020-12-17 | End: 2020-12-19 | Stop reason: HOSPADM

## 2020-12-17 RX ORDER — SODIUM CHLORIDE 0.9 % (FLUSH) 0.9 %
5-40 SYRINGE (ML) INJECTION EVERY 8 HOURS
Status: DISCONTINUED | OUTPATIENT
Start: 2020-12-17 | End: 2020-12-19 | Stop reason: HOSPADM

## 2020-12-17 RX ORDER — BUPROPION HYDROCHLORIDE 150 MG/1
150 TABLET ORAL
Status: DISCONTINUED | OUTPATIENT
Start: 2020-12-17 | End: 2020-12-19 | Stop reason: HOSPADM

## 2020-12-17 RX ORDER — DILTIAZEM HYDROCHLORIDE 180 MG/1
180 CAPSULE, COATED, EXTENDED RELEASE ORAL DAILY
Status: DISCONTINUED | OUTPATIENT
Start: 2020-12-17 | End: 2020-12-19 | Stop reason: HOSPADM

## 2020-12-17 RX ADMIN — PANTOPRAZOLE SODIUM 40 MG: 40 TABLET, DELAYED RELEASE ORAL at 08:31

## 2020-12-17 RX ADMIN — Medication 10 ML: at 21:19

## 2020-12-17 RX ADMIN — FLUOXETINE 40 MG: 20 CAPSULE ORAL at 08:31

## 2020-12-17 RX ADMIN — DILTIAZEM HYDROCHLORIDE 180 MG: 180 CAPSULE, COATED, EXTENDED RELEASE ORAL at 08:32

## 2020-12-17 RX ADMIN — Medication 10 ML: at 14:00

## 2020-12-17 RX ADMIN — SODIUM CHLORIDE 75 ML/HR: 900 INJECTION, SOLUTION INTRAVENOUS at 05:09

## 2020-12-17 RX ADMIN — BUPROPION HYDROCHLORIDE 150 MG: 150 TABLET, EXTENDED RELEASE ORAL at 08:31

## 2020-12-17 RX ADMIN — CEFTRIAXONE 1 G: 1 INJECTION, POWDER, FOR SOLUTION INTRAMUSCULAR; INTRAVENOUS at 16:27

## 2020-12-17 RX ADMIN — SODIUM CHLORIDE 75 ML/HR: 900 INJECTION, SOLUTION INTRAVENOUS at 21:19

## 2020-12-17 RX ADMIN — APIXABAN 2.5 MG: 2.5 TABLET, FILM COATED ORAL at 21:23

## 2020-12-17 RX ADMIN — APIXABAN 2.5 MG: 2.5 TABLET, FILM COATED ORAL at 08:31

## 2020-12-17 NOTE — H&P
History & Physical    Primary Care Provider: Ayden Odonnell MD  Source of Information: Patient chart review and family at bedside. History of Presenting Illness:   Shay Geronimo is a 80 y.o. female with past medical history significant for paroxysmal A. fib on AC with Eliquis, major depressive disorder, dyslipidemia, GERD, frequent UTI who presents with complaints of UTI. Patient states that she was notified by her home care provider that she had had a urinalysis which was \"positive\". Daughter who is at bedside states that she was notified that patient who lives in an assisted living community was noted to be mildly sluggish and altered. Daughter however confirms that patient is back at her baseline but was advised to seek ER care for further treatment. Patient states she has otherwise been doing well and denies any flank pain, hematuria, dysuria, abdominal or suprapubic tenderness, nausea or vomiting. The patient denies any fever, chills, chest pain, cough, congestion, recent illness, palpitations, or dysuria. Upon ER presentation, vital signs were remarkable for blood pressure 145/90 and respiratory rate of 24. Labs were remarkable for lactic acid of 6.3, leukocytosis of 11.9, potassium of 3.3. Urinalysis was remarkable for 4+ bacteria, moderate leukocyte esterase and ketones. Patient was treated with 1 L normal saline bolus, cefepime 2 g, morphine 2 mg     Review of Systems:  A comprehensive review of systems was negative except for that written in the History of Present Illness. Past Medical History:   Diagnosis Date    Acid reflux     Depression     Hypercholesterolemia     PAF (paroxysmal atrial fibrillation) ([facility] [address]     On 6/17/15 she presented in Afib with RVR; spontaneously converted to NSR. Also Afib with RVR on 7/11/16.       Uterine prolapse       Past Surgical History:   Procedure Laterality Date    HX APPENDECTOMY      HX CHOLECYSTECTOMY Prior to Admission medications    Medication Sig Start Date End Date Taking? Authorizing Provider   dilTIAZem ER (CARDIZEM CD) 180 mg capsule TAKE 1 CAPSULE BY MOUTH ONCE DAILY 8/24/20   Estela Navarrete MD   apixaban (Eliquis) 2.5 mg tablet TAKE 1 TABLET BY MOUTH TWO  TIMES DAILY 5/19/20   Estela Navarrete MD   Omeprazole delayed release (PRILOSEC D/R) 20 mg tablet Take 20 mg by mouth daily. Provider, Historical   calcium-cholecalciferol, d3, (CALCIUM 600 + D) 600-125 mg-unit tab Take  by mouth daily. Provider, Historical   magnesium oxide 500 mg tab Take  by mouth daily. Provider, Historical   escitalopram oxalate (LEXAPRO PO) Take  by mouth daily. Provider, Historical   cyanocobalamin 1,000 mcg tablet Take 1,000 mcg by mouth daily. Provider, Historical   acetaminophen (TYLENOL) 325 mg tablet Take  by mouth every four (4) hours as needed for Pain. Provider, Historical   fish oil-omega-3 fatty acids (FISH OIL) 340-1,000 mg capsule Take 1 Cap by mouth daily. Provider, Historical   ascorbic acid (VITAMIN C) 500 mg tablet Take 1,000 mg by mouth daily. Provider, Historical     Allergies   Allergen Reactions    Penicillins Swelling    Prednisone Other (comments)     Altered mental status. Family History   Problem Relation Age of Onset    Heart Disease Brother         irregular heart beat        SOCIAL HISTORY:  Patient resides:  Independently    Assisted Living x   SNF    With family care       Smoking history:   None x   Former    Chronic      Alcohol history:   None x   Social    Chronic      Ambulates:   Independently x   w/cane    w/walker    w/wc    CODE STATUS:  DNR x   Full    Other      Objective:     Physical Exam:     Visit Vitals  /87   Pulse 81   Temp 97.4 °F (36.3 °C)   Resp 24   Ht 5' 5\" (1.651 m)   Wt 49 kg (108 lb)   SpO2 99%   BMI 17.97 kg/m²      O2 Device: Room air    General:  Alert, cooperative, no distress, appears stated age.    Head:  Normocephalic, without obvious abnormality, atraumatic. Eyes:  Conjunctivae/corneas clear. PERRL, EOMs intact. Nose: Nares normal. Septum midline. Mucosa normal. No drainage or sinus tenderness. Throat: Lips,tongue normal. Teeth and gums normal.  Dry oral mucosa   Neck: Supple, symmetrical, trachea midline, no adenopathy, thyroid: no enlargement/tenderness/nodules. Back:    No CVA tenderness. Lungs:   Clear to auscultation bilaterally. Chest wall:  No tenderness or deformity. Heart:  Regular rate and rhythm, S1, S2 normal   Abdomen:   Soft, non-tender. Bowel sounds normal. No masses,  No organomegaly. No suprapubic tenderness. Extremities: Extremities normal, atraumatic, no cyanosis or edema. Pulses: 2+ and symmetric all extremities. Skin: Skin color, texture, turgor normal. No rashes or lesions   Neurologic: CNII-XII intact. EKG: NSR. Nonspecific ST and T wave changes. Data Review:     Recent Days:  Recent Labs     12/16/20  1732   WBC 11.9*   HGB 14.5   HCT 43.0        Recent Labs     12/16/20  1732      K 3.3*      CO2 25   *   BUN 23*   CREA 1.20*   CA 9.6   MG 2.1   ALB 3.9   ALT 17     No results for input(s): PH, PCO2, PO2, HCO3, FIO2 in the last 72 hours.     24 Hour Results:  Recent Results (from the past 24 hour(s))   LACTIC ACID    Collection Time: 12/16/20  5:32 PM   Result Value Ref Range    Lactic acid 6.3 (HH) 0.4 - 2.0 MMOL/L   CBC WITH AUTOMATED DIFF    Collection Time: 12/16/20  5:32 PM   Result Value Ref Range    WBC 11.9 (H) 3.6 - 11.0 K/uL    RBC 4.85 3.80 - 5.20 M/uL    HGB 14.5 11.5 - 16.0 g/dL    HCT 43.0 35.0 - 47.0 %    MCV 88.7 80.0 - 99.0 FL    MCH 29.9 26.0 - 34.0 PG    MCHC 33.7 30.0 - 36.5 g/dL    RDW 12.6 11.5 - 14.5 %    PLATELET 569 040 - 180 K/uL    MPV 8.8 (L) 8.9 - 12.9 FL    NRBC 0.0 0  WBC    ABSOLUTE NRBC 0.00 0.00 - 0.01 K/uL    NEUTROPHILS 73 32 - 75 %    LYMPHOCYTES 17 12 - 49 %    MONOCYTES 9 5 - 13 %    EOSINOPHILS 0 0 - 7 % BASOPHILS 0 0 - 1 %    IMMATURE GRANULOCYTES 1 (H) 0.0 - 0.5 %    ABS. NEUTROPHILS 8.6 (H) 1.8 - 8.0 K/UL    ABS. LYMPHOCYTES 2.0 0.8 - 3.5 K/UL    ABS. MONOCYTES 1.1 (H) 0.0 - 1.0 K/UL    ABS. EOSINOPHILS 0.1 0.0 - 0.4 K/UL    ABS. BASOPHILS 0.1 0.0 - 0.1 K/UL    ABS. IMM. GRANS. 0.1 (H) 0.00 - 0.04 K/UL    DF AUTOMATED     METABOLIC PANEL, COMPREHENSIVE    Collection Time: 12/16/20  5:32 PM   Result Value Ref Range    Sodium 137 136 - 145 mmol/L    Potassium 3.3 (L) 3.5 - 5.1 mmol/L    Chloride 100 97 - 108 mmol/L    CO2 25 21 - 32 mmol/L    Anion gap 12 5 - 15 mmol/L    Glucose 139 (H) 65 - 100 mg/dL    BUN 23 (H) 6 - 20 MG/DL    Creatinine 1.20 (H) 0.55 - 1.02 MG/DL    BUN/Creatinine ratio 19 12 - 20      GFR est AA 51 (L) >60 ml/min/1.73m2    GFR est non-AA 42 (L) >60 ml/min/1.73m2    Calcium 9.6 8.5 - 10.1 MG/DL    Bilirubin, total 0.5 0.2 - 1.0 MG/DL    ALT (SGPT) 17 12 - 78 U/L    AST (SGOT) 16 15 - 37 U/L    Alk.  phosphatase 93 45 - 117 U/L    Protein, total 7.5 6.4 - 8.2 g/dL    Albumin 3.9 3.5 - 5.0 g/dL    Globulin 3.6 2.0 - 4.0 g/dL    A-G Ratio 1.1 1.1 - 2.2     TROPONIN I    Collection Time: 12/16/20  5:32 PM   Result Value Ref Range    Troponin-I, Qt. <0.05 <0.05 ng/mL   MAGNESIUM    Collection Time: 12/16/20  5:32 PM   Result Value Ref Range    Magnesium 2.1 1.6 - 2.4 mg/dL   URINALYSIS W/MICROSCOPIC    Collection Time: 12/16/20  5:32 PM   Result Value Ref Range    Color YELLOW/STRAW      Appearance TURBID (A) CLEAR      Specific gravity 1.020 1.003 - 1.030      pH (UA) 8.0 5.0 - 8.0      Protein 30 (A) NEG mg/dL    Glucose Negative NEG mg/dL    Ketone 40 (A) NEG mg/dL    Bilirubin Negative NEG      Blood Negative NEG      Urobilinogen 1.0 0.2 - 1.0 EU/dL    Nitrites Negative NEG      Leukocyte Esterase MODERATE (A) NEG      WBC 20-50 0 - 4 /hpf    RBC 0-5 0 - 5 /hpf    Epithelial cells FEW FEW /lpf    Bacteria 4+ (A) NEG /hpf    Hyaline cast 0-2 0 - 5 /lpf   URINE CULTURE HOLD SAMPLE    Collection Time: 12/16/20  5:32 PM    Specimen: Serum; Urine   Result Value Ref Range    Urine culture hold        Urine on hold in Microbiology dept for 2 days. If unpreserved urine is submitted, it cannot be used for addtional testing after 24 hours, recollection will be required. SAMPLES BEING HELD    Collection Time: 12/16/20  5:32 PM   Result Value Ref Range    SAMPLES BEING HELD SST.GRN. ALEXANDER     COMMENT        Add-on orders for these samples will be processed based on acceptable specimen integrity and analyte stability, which may vary by analyte. EKG, 12 LEAD, INITIAL    Collection Time: 12/16/20  6:07 PM   Result Value Ref Range    Ventricular Rate 84 BPM    Atrial Rate 84 BPM    P-R Interval 162 ms    QRS Duration 80 ms    Q-T Interval 436 ms    QTC Calculation (Bezet) 515 ms    Calculated P Axis 84 degrees    Calculated R Axis -28 degrees    Calculated T Axis 54 degrees    Diagnosis       Normal sinus rhythm  Low voltage QRS  Nonspecific ST and T wave abnormality  Abnormal ECG  When compared with ECG of 27-MAR-2016 14:16,  Sinus rhythm has replaced Atrial fibrillation  Vent. rate has decreased BY  66 BPM  QRS axis shifted left  T wave inversion now evident in Anterior leads           Imaging:     Assessment:     Active Problems:    Severe sepsis ([address] (12/16/2020)    Mukesh Rausch is a 80 y.o. female with past medical history significant for paroxysmal A. fib on AC with Eliquis, major depressive disorder, dyslipidemia, GERD, frequent UTI who was admitted for sepsis secondary to acute cystitis       Plan:      Sepsis secondary to acute cystitis  -Continue ABX with cefepime  -Follow urine and blood cultures  -Sepsis bundle activated  -Recommend daily maintenance ABX at discharge (5 episodes this year)    Paroxysmal atrial fibrillation  -Continue Eliquis and diltiazem    GERD  -Protonix    Major depressive disorder  -Continue Wellbutrin and Prozac.     Chronic low back pain  -Home Norco as needed        FEN/GI - NA @ 75ML/HR  Activity -as tolerated  DVT prophylaxis -Eliquis  GI prophylaxis -not indicated  Disposition - Plan to d/c to home.     CODE STATUS: DNR       Signed By: Alexandre Head, MD     December 16, 2020

## 2020-12-17 NOTE — PROGRESS NOTES
Admission Medication Reconciliation:    Comments/Recommendations:  -Medication history obtained via medication list at bedside which is consistent with prescription fill history in RxQuery    Medications added: Bupropion  mg daily, fluoxetine 40 mg daily  Medications removed: Vitamin B12, magnesium oxide  Medications changed: None    Information obtained from: Medication list at bedside, RxQuery, chart review    Significant PMH/Disease States:   Past Medical History:   Diagnosis Date    Acid reflux     Depression     Hypercholesterolemia     PAF (paroxysmal atrial fibrillation) ([facility] [address]     On 6/17/15 she presented in Afib with RVR; spontaneously converted to NSR. Also Afib with RVR on 7/11/16. Uterine prolapse        Chief Complaint for this Admission:    Chief Complaint   Patient presents with    Back Pain    Shortness of Breath       Allergies:  Penicillins and Prednisone    Prior to Admission Medications:   Prior to Admission Medications   Prescriptions Last Dose Informant Patient Reported? Taking? FLUoxetine (PROzac) 40 mg capsule 12/16/2020 at AM  Yes Yes   Sig: Take 40 mg by mouth daily. Omeprazole delayed release (PRILOSEC D/R) 20 mg tablet 12/16/2020 at AM  Yes Yes   Sig: Take 20 mg by mouth Daily (before breakfast). acetaminophen (TYLENOL) 325 mg tablet 12/9/2020 at Unknown time  Yes Yes   Sig: Take 650 mg by mouth every six (6) hours as needed for Pain. apixaban (Eliquis) 2.5 mg tablet 12/16/2020 at AM  No Yes   Sig: TAKE 1 TABLET BY MOUTH TWO  TIMES DAILY   ascorbic acid (VITAMIN C) 500 mg tablet 12/15/2020 at dinner Self Yes Yes   Sig: Take 1,000 mg by mouth daily (with dinner). buPROPion XL (WELLBUTRIN XL) 150 mg tablet 12/16/2020 at AM  Yes Yes   Sig: Take 150 mg by mouth every morning. cholecalciferol (Vitamin D3) (1000 Units /25 mcg) tablet 12/16/2020 at AM  Yes Yes   Sig: Take 1,000 Units by mouth daily.    dilTIAZem ER (CARDIZEM CD) 180 mg capsule 12/16/2020 at AM  No Yes   Sig: TAKE 1 CAPSULE BY MOUTH ONCE DAILY   fish oil-omega-3 fatty acids (FISH OIL) 340-1,000 mg capsule 12/15/2020 at evening Self Yes Yes   Sig: Take 1 Cap by mouth every evening.       Facility-Administered Medications: None       Thank you,  Jorge L Quevedo, PHARMD

## 2020-12-17 NOTE — ED NOTES
Verbal report given to Rakesh Cummings (name) on Jorje Francisco for routine progression of care    Report consisted of patient's Situation, Background, Assessment and Recommendations (SBAR)    Information from the following report(s)  SBAR, Kardex, ED Summary, Intake/Output, Recent Results and Cardiac Rhythm NSR was reviewed with the receiving nurse. Opportunity for questions and clarification was provided. Last Filed Values:  Temp: 97.4 °F (36.3 °C) (12/16/20 1727)  Pulse (Heart Rate): 81 (12/16/20 1727)  Resp Rate: 24 (12/16/20 1727)  O2 Sat (%): 99 % (12/16/20 1915)  BP: (!) 155/65 (12/16/20 1915)  MAP (Monitor): 86 (12/16/20 1915)  MAP (Calculated): 108 (12/16/20 1727)  Level of Consciousness: Alert (12/16/20 1727)      Lab Results   Component Value Date/Time    WBC 11.9 (H) 12/16/2020 05:32 PM    Lactic acid 6.3 (HH) 12/16/2020 05:32 PM       Repeat LA:  Time Due 2132    Blood Cultures Drawn:  yes    Fluid Resuscitation:  Total needed 1470, Status infusing    All Antibiotics Started:  yes, Dose Due 12/17    VS x 2 post-fluid resuscitation:   no (still infusing)    Vasopressor Infusion:  no n/a    Provider Reassessment needed and notified:  no , Due after fluids completed.      Additional Interventions/Comments:  n/a

## 2020-12-17 NOTE — PROGRESS NOTES
[facility] [address]  [address] [facility], [address] [place] [address]  [phone]      Medical Progress Note      NAME: Melissa Read   :  1928  MRM:  [id number]    Date of service: 2020  3:23 PM         Subjective:     Chief Complaint:  F/u UTI    No acute events. Denies cp, sob. Feels good today          Objective:       Vitals:          Last 24hrs VS reviewed since prior progress note.  Most recent are:    Visit Vitals  /70 (BP 1 Location: Left arm, BP Patient Position: At rest)   Pulse 67   Temp 97.6 °F (36.4 °C)   Resp 16   Ht 5' 5\" (1.651 m)   Wt 49 kg (108 lb)   SpO2 99%   BMI 17.97 kg/m²     SpO2 Readings from Last 6 Encounters:   20 99%   20 98%   17 97%   17 98%   17 96%   17 98%            Intake/Output Summary (Last 24 hours) at 2020 1523  Last data filed at 2020 0548  Gross per 24 hour   Intake 1120 ml   Output    Net 1120 ml          Exam:     Physical Exam:    Gen:  Well-developed, well-nourished, in no acute distress  HEENT:  Pink conjunctivae, PERRL, hearing intact to voice, moist mucous membranes  Neck:  Supple, without masses, thyroid non-tender  Resp:  No accessory muscle use, clear breath sounds without wheezes rales or rhonchi  Card:  No murmurs, normal S1, S2 without thrills, bruits or peripheral edema  Abd:  Soft, non-tender, non-distended, normoactive bowel sounds are present  Musc:  No cyanosis or clubbing  Skin:  No rashes or ulcers, skin turgor is good  Neuro:  Cranial nerves 3-12 are grossly intact,  strength is 5/5 bilaterally and dorsi / plantarflexion is 5/5 bilaterally, follows commands appropriately  Psych:  Good insight, oriented to person, place and time, alert            Medications Reviewed: (see below)    Lab Data Reviewed: (see below)    ______________________________________________________________________    Medications:     Current Facility-Administered Medications   Medication Dose Route Frequency    apixaban (ELIQUIS) tablet 2.5 mg  2.5 mg Oral BID    buPROPion XL (WELLBUTRIN XL) tablet 150 mg  150 mg Oral 7am    dilTIAZem ER (CARDIZEM CD) capsule 180 mg  180 mg Oral DAILY    FLUoxetine (PROzac) capsule 40 mg  40 mg Oral DAILY    pantoprazole (PROTONIX) tablet 40 mg  40 mg Oral ACB    sodium chloride (NS) flush 5-40 mL  5-40 mL IntraVENous Q8H    sodium chloride (NS) flush 5-40 mL  5-40 mL IntraVENous PRN    polyethylene glycol (MIRALAX) packet 17 g  17 g Oral DAILY PRN    0.9% sodium chloride infusion  75 mL/hr IntraVENous CONTINUOUS    sodium chloride (NS) flush 5-10 mL  5-10 mL IntraVENous PRN    sodium chloride (NS) flush 5-40 mL  5-40 mL IntraVENous Q8H    sodium chloride (NS) flush 5-40 mL  5-40 mL IntraVENous PRN    acetaminophen (TYLENOL) tablet 650 mg  650 mg Oral Q4H PRN    HYDROcodone-acetaminophen (NORCO) 5-325 mg per tablet 1 Tab  1 Tab Oral Q4H PRN    naloxone (NARCAN) injection 0.4 mg  0.4 mg IntraVENous PRN    ondansetron (ZOFRAN) injection 4 mg  4 mg IntraVENous Q4H PRN    cefepime (MAXIPIME) 2 g in sterile water (preservative free) 10 mL IV syringe  2 g IntraVENous Q24H            Lab Review:     Recent Labs     12/17/20  0411 12/16/20  1732   WBC 9.1 11.9*   HGB 10.9* 14.5   HCT 32.8* 43.0    370     Recent Labs     12/17/20  0411 12/16/20  1732    137   K 3.8 3.3*   * 100   CO2 28 25   GLU 95 139*   BUN 18 23*   CREA 0.90 1.20*   CA 8.6 9.6   MG 2.2 2.1   ALB  --  3.9   ALT  --  17     No components found for: GLPOC         Assessment / Plan:     Sepsis / UTI: POA. Urine culture pending. Change cefepime to CTX. COVID-. Daughter reports frequent UTIs and just recently completed a course of abx; will wait for sensitivities to ensure she does not have antibiotic resistant bacteria    Elevated lactic acid: 6.3 on arrival, normalized with IVF. 2/2 above    Afib: rate controlled currently.  Continue dilt and eliquis    Depression: continue prozac and wellbutrin    GERD: continue protonix      Total time spent with patient: 30 [address] discussed with: Patient and Family    Discussed:  Care Plan    Prophylaxis:  eliquis    Disposition:   PT, OT, RN           ___________________________________________________    Attending Physician: John Sun MD

## 2020-12-17 NOTE — PROGRESS NOTES
Problem: Mobility Impaired (Adult and Pediatric)  Goal: *Acute Goals and Plan of Care (Insert Text)  Outcome: Progressing Towards Goal  Note:   PHYSICAL THERAPY EVALUATION  Patient: Mukesh Rausch (23 y.o. female)  Date: 12/17/2020  Primary Diagnosis: Severe sepsis ([facility] [address] [A41.9, R65.20]        Precautions:   Fall(Droplet Plus)      ASSESSMENT  Based on the objective data described below, the patient presents with decreased endurance and balance following admission for back pain and found to have a UTI. Patient overall required assist x1 with RW, increased posterior lean and poor LE sequencing and coordination leading to increased assitance for stability. Patient vitals were stable throughout. Current Level of Function Impacting Discharge (mobility/balance): MOD A x1 with RW sit-stand, stand-sit MAX A due to increased poserior loss of balance    Functional Outcome Measure: The patient scored Total: 20/100 on the Barthel Index which is indicative of 80% impaired ability to care for basic self needs/dependency on others. Other factors to consider for discharge: ]     Patient will benefit from skilled therapy intervention to address the above noted impairments. PLAN :  Recommendations and Planned Interventions: bed mobility training, transfer training, gait training, therapeutic exercises, and therapeutic activities      Frequency/Duration: Patient will be followed by physical therapy:  5 times a week to address goals. Recommendation for discharge: (in order for the patient to meet his/her long term goals)  Physical therapy at least 2 days/week in the home AND ensure assist and/or supervision for safety with      This discharge recommendation:  A follow-up discussion with the attending provider and/or case management is planned    IF patient discharges home will need the following DME: to be determined (TBD)         SUBJECTIVE:   Patient stated you want me to stay here.     OBJECTIVE DATA SUMMARY: HISTORY:    Past Medical History:   Diagnosis Date    Acid reflux     Depression     Hypercholesterolemia     PAF (paroxysmal atrial fibrillation) ([facility] [address]     On 6/17/15 she presented in Afib with RVR; spontaneously converted to NSR. Also Afib with RVR on 7/11/16. Uterine prolapse      Past Surgical History:   Procedure Laterality Date    HX APPENDECTOMY      HX CHOLECYSTECTOMY         Personal factors and/or comorbidities impacting plan of care: see above    Home Situation  Home Environment: Group home  # Steps to Enter: 0  One/Two Story Residence: One story  Living Alone: No  Support Systems: (group home staffing)  Patient Expects to be Discharged to[de-identified] Group home  Current DME Used/Available at Home: Walker, rolling    EXAMINATION/PRESENTATION/DECISION MAKING:   Critical Behavior:  Neurologic State: Alert, Confused  Orientation Level: Oriented to person, Oriented to time, Disoriented to place, Disoriented to situation  Cognition: Follows commands, Impaired decision making, Impulsive, Memory loss(Noted decreased STM 2/2 consistently asking therapist's name)  Safety/Judgement: Decreased awareness of environment, Decreased awareness of need for assistance, Decreased awareness of need for safety, Decreased insight into deficits  Hearing: Auditory  Auditory Impairment: None    Range Of Motion:  AROM: Generally decreased, functional           PROM: Generally decreased, functional           Strength:    Strength: Generally decreased, functional                    Tone & Sensation:   Tone: Normal              Sensation: Intact               Coordination:  Coordination: Generally decreased, functional  Vision:   Corrective Lenses: Glasses  Functional Mobility:  Bed Mobility:  Rolling: Supervision  Supine to Sit: Supervision; Additional time;Bed Modified(head of bed elevated/use of bed rails)  Sit to Supine: Supervision;Bed Modified(head of bed elevated; use of grab bars)  Scooting: Stand-by assistance  Transfers:  Sit to Stand: Moderate assistance  Stand to Sit: Maximum assistance        Bed to Chair: Moderate assistance;Assist x1;Additional time              Balance:   Sitting: Intact; With support  Standing: Impaired; With support  Standing - Static: Poor;Constant support  Standing - Dynamic : Poor;Constant support  Ambulation/Gait Training:                            Functional Measure:  Barthel Index:    Bathin  Bladder: 5  Bowels: 5  Groomin  Dressin  Feedin  Mobility: 0  Stairs: 0  Toilet Use: 0  Transfer (Bed to Chair and Back): 5  Total: 20/100       The Barthel ADL Index: Guidelines  1. The index should be used as a record of what a patient does, not as a record of what a patient could do. 2. The main aim is to establish degree of independence from any help, physical or verbal, however minor and for whatever reason. 3. The need for supervision renders the patient not independent. 4. A patient's performance should be established using the best available evidence. Asking the patient, friends/relatives and nurses are the usual sources, but direct observation and common sense are also important. However direct testing is not needed. 5. Usually the patient's performance over the preceding 24-48 hours is important, but occasionally longer periods will be relevant. 6. Middle categories imply that the patient supplies over 50 per cent of the effort. 7. Use of aids to be independent is allowed. Hiram Zuleta., Barthel, D.W. ([id number]). Functional evaluation: the Barthel Index. [facility] [address] [facility] (14)2. DANNA Hopson, Allison Espinoza, Mendez Ch, [address] [place] [address] (). Measuring the change indisability after inpatient rehabilitation; comparison of the responsiveness of the Barthel Index and Functional [place] Measure. Journal of Neurology, Neurosurgery, and Psychiatry, 66(4), 334-727.   Luis Alberto Muniz, N.J.A, VITO Brian.SAMMI, & Suad York M.A. (2004.) Assessment of post-stroke quality of life in cost-effectiveness studies: The usefulness of the Barthel Index and the EuroQoL-5D. Quality of Life Research, 15, 100-45        Physical Therapy Evaluation Charge Determination   History Examination Presentation Decision-Making   HIGH Complexity :3+ comorbidities / personal factors will impact the outcome/ POC  MEDIUM Complexity : 3 Standardized tests and measures addressing body structure, function, activity limitation and / or participation in recreation  MEDIUM Complexity : Evolving with changing characteristics  Other outcome measures barthel index  MEDIUM      Based on the above components, the patient evaluation is determined to be of the following complexity level: MEDIUM    Pain Rating:  None reported    Activity Tolerance:   Fair    After treatment patient left in no apparent distress:   Sitting in chair, Call bell within reach, and Bed / chair alarm activated    COMMUNICATION/EDUCATION:   The patients plan of care was discussed with: Occupational therapist and Registered nurse. Fall prevention education was provided and the patient/caregiver indicated understanding., Patient/family have participated as able in goal setting and plan of care. , and Patient/family agree to work toward stated goals and plan of care.     Thank you for this referral.  Laurence Russo, PT, DPT   Time Calculation: 24 mins

## 2020-12-17 NOTE — PROGRESS NOTES
Problem: Self Care Deficits Care Plan (Adult)  Goal: *Acute Goals and Plan of Care (Insert Text)  Description:   FUNCTIONAL STATUS PRIOR TO ADMISSION: Pt poor historian; therefore, PLOF undetermined; however, chart indicates pt resides at group home and uses RW when feeling good. Pt noted with good distal LE reaching and reports, \"I normally move good until all this happened,\" indicating fairly active lifestyle. HOME SUPPORT: Pt resides at group home with ADL/IADL assist provided by staffing. Occupational Therapy Goals  Initiated 12/17/2020  1. Patient will perform RW grooming with contact guard assist within 7 day(s). 2.  Patient will perform EOB/RW bathing with minimal assistance within 7 day(s). 3.  Patient will perform lower body dressing with contact guard assist within 7 day(s). 4.  Patient will perform RW toilet transfers with contact guard assist within 7 day(s). 5.  Patient will perform all aspects of RW toileting with contact guard assist within 7 day(s). Outcome: Not Met    OCCUPATIONAL THERAPY EVALUATION  Patient: Jorje Francisco (83 y.o. female)  Date: 12/17/2020  Primary Diagnosis: Severe sepsis ([facility] [A41.9, R65.20]        Precautions:  Fall(Droplet Plus)    ASSESSMENT  Based on the objective data described below, the patient presents with decreased problem solving/sequencing/safety/task initiation, decreased functional mobility/balance, decreased strength/endurance and decreased activity tolerance, all of which limited pt's ability to complete self-care routine at level congruent with PLOF. Currently, pt benefits from Supervision for self-feeding, S/U and Supervision for seated grooming and UB dressing, Moderate assist for bathing and LE dressing and Max A for toileting. Pt noted with great full body reaching, implementing crossed leg technique to doff/shree socks seated EOB; however, demonstrated strong posterior lean during standing pivot turn transfer to [facility] from EOB.   Pt also demonstrated poor safety awareness evidenced by OT arriving to pt's room with pt attempted to independently transfer out of bed. Pt benefits from skilled OT to address above listed functional deficits in an overall attempt at maximizing pt's highest level of safe functional independence prior to discharge, with reporting therapist believing pt will benefit from return to group home with [facility] services and ADL/IADL Supervision. Current Level of Function Impacting Discharge (ADLs/self-care): Supervision for self-feeding, S/U and Supervision for seated grooming and UB dressing, Moderate assist for bathing and LE dressing and Max A for toileting    Functional Outcome Measure: The patient scored 20/100 on the Barthel Index outcome measure. Other factors to consider for discharge: Fall risk, confusion     Patient will benefit from skilled therapy intervention to address the above noted impairments. PLAN :  Recommendations and Planned Interventions: self care training, functional mobility training, therapeutic exercise, balance training, therapeutic activities, endurance activities, and patient education    Frequency/Duration: Patient will be followed by occupational therapy 4 times a week to address goals. Recommendation for discharge: (in order for the patient to meet his/her long term goals)  Occupational therapy at least 2 days/week in the home AND ensure assist and/or supervision for safety with ADL/IADLs    This discharge recommendation:  Has not yet been discussed the attending provider and/or case management    IF patient discharges home will need the following DME: patient owns DME required for discharge       SUBJECTIVE:   Patient stated I normally move good until all this happened.     OBJECTIVE DATA SUMMARY:   HISTORY:   Past Medical History:   Diagnosis Date    Acid reflux     Depression     Hypercholesterolemia     PAF (paroxysmal atrial fibrillation) ([facility] [address]     On 6/17/15 she presented in Afib with RVR; spontaneously converted to NSR. Also Afib with RVR on 7/11/16. Uterine prolapse      Past Surgical History:   Procedure Laterality Date    HX APPENDECTOMY      HX CHOLECYSTECTOMY         Expanded or extensive additional review of patient history:     Home Situation  Home Environment: Group home  # Steps to Enter: 0  One/Two Story Residence: One story  Living Alone: No  Support Systems: (group home staffing)  Patient Expects to be Discharged to[de-identified] Group home  Current DME Used/Available at Home: Walker, rolling    Hand dominance: Right    EXAMINATION OF PERFORMANCE DEFICITS:  Cognitive/Behavioral Status:  Neurologic State: Alert;Confused  Orientation Level: Oriented to person;Oriented to time;Disoriented to place; Disoriented to situation  Cognition: Follows commands; Impaired decision making; Impulsive;Memory loss(Noted decreased STM 2/2 consistently asking therapist's name)  Perception: Cues to maintain midline in standing(2/2 strong posterior lean noted)  Perseveration: No perseveration noted  Safety/Judgement: Decreased awareness of environment;Decreased awareness of need for assistance;Decreased awareness of need for safety;Decreased insight into deficits    Hearing: Auditory  Auditory Impairment: None    Vision/Perceptual:    Corrective Lenses: Glasses    Range of Motion:  AROM: Generally decreased, functional  PROM: Generally decreased, functional    Strength:  Strength: Generally decreased, functional    Coordination:  Coordination: Generally decreased, functional  Fine Motor Skills-Upper: Left Intact; Right Intact    Gross Motor Skills-Upper: Left Intact; Right Intact    Tone & Sensation:  Tone: Normal  Sensation: Intact    Balance:  Sitting: Intact; With support  Standing: Impaired; With support  Standing - Static: Poor;Constant support  Standing - Dynamic : Poor;Constant support    Functional Mobility and Transfers for ADLs:  Bed Mobility:  Rolling: Supervision  Supine to Sit: Supervision; Additional time;Bed Modified(head of bed elevated/use of bed rails)  Sit to Supine: Supervision;Bed Modified(head of bed elevated; use of grab bars)  Scooting: Stand-by assistance    Transfers:  Sit to Stand: Maximum assistance(2/2 strong posterior lean)  Stand to Sit: Maximum assistance(2/2 abruptly sitting)  Bed to Chair: Maximum assistance; Moderate assistance  Toilet Transfer : Maximum assistance(EOB to BSC (and back))    ADL Assessment:  Feeding: Supervision    Oral Facial Hygiene/Grooming: Setup(seated EOB)    Bathing: Moderate assistance(for thoroughness and balance during standing)    Upper Body Dressing: Setup;Supervision(seated EOB)    Lower Body Dressing: Moderate assistance(for balance during proximal management)    Toileting: Maximum assistance(for transfers and CM; Supervision for hygiene)    ADL Intervention and task modifications:  Pt educated on safe transfer techniques, with specific emphasis on proper hand placement to push up from seated surface rather than attempt to pull self up, fully positioning self in-front of desired seated location, feeling chair on back of legs and reaching back with 1-2 UE to slowly lower self to seated position. Cognitive Retraining  Safety/Judgement: Decreased awareness of environment;Decreased awareness of need for assistance;Decreased awareness of need for safety;Decreased insight into deficits    Functional Measure:  Barthel Index:    Bathin  Bladder: 5  Bowels: 5  Groomin  Dressin  Feedin  Mobility: 0  Stairs: 0  Toilet Use: 0  Transfer (Bed to Chair and Back): 5  Total: 20/100        The Barthel ADL Index: Guidelines  1. The index should be used as a record of what a patient does, not as a record of what a patient could do. 2. The main aim is to establish degree of independence from any help, physical or verbal, however minor and for whatever reason. 3. The need for supervision renders the patient not independent.   4. A patient's performance should be established using the best available evidence. Asking the patient, friends/relatives and nurses are the usual sources, but direct observation and common sense are also important. However direct testing is not needed. 5. Usually the patient's performance over the preceding 24-48 hours is important, but occasionally longer periods will be relevant. 6. Middle categories imply that the patient supplies over 50 per cent of the effort. 7. Use of aids to be independent is allowed. Federica Clement., Barthel, DSudhaW. ([id number]). Functional evaluation: the Barthel Index. [address] [facility] (14)2. Sonia Treviño fabiola DANNA Martinez, Marian Reyes., [place], [address] (1999). Measuring the change indisability after inpatient rehabilitation; comparison of the responsiveness of the Barthel Index and Functional [place] Measure. Journal of Neurology, Neurosurgery, and Psychiatry, 66(4), 744-286. Sandeep Smith, N.J.A, ELIJAH Brian, & Yesenia Mojica MSudhaA. (2004.) Assessment of post-stroke quality of life in cost-effectiveness studies: The usefulness of the Barthel Index and the EuroQoL-5D.  Quality of Life Research, 15, 619-99     Occupational Therapy Evaluation Charge Determination   History Examination Decision-Making   LOW Complexity : Brief history review  HIGH Complexity : 5 or more performance deficits relating to physical, cognitive , or psychosocial skils that result in activity limitations and / or participation restrictions LOW Complexity : No comorbidities that affect functional and no verbal or physical assistance needed to complete eval tasks       Based on the above components, the patient evaluation is determined to be of the following complexity level: LOW   Pain Rating:  No c/o pain    Activity Tolerance:   Fair and requires rest breaks    After treatment patient left in no apparent distress:    Supine in bed, Call bell within reach, Bed / chair alarm activated, and Side rails x 3    COMMUNICATION/EDUCATION:   The patients plan of care was discussed with: Physical therapist and Registered nurse. Patient is unable to participate in goal setting and plan of care. This patients plan of care is appropriate for delegation to [facility].     Thank you for this referral.  [facility]   Time Calculation: 21 mins

## 2020-12-17 NOTE — PROGRESS NOTES
TRANSFER - IN REPORT:    Verbal report received from Gene(name) on [facility]  being received from Ed(unit) for routine progression of care      Report consisted of patients Situation, Background, Assessment and   Recommendations(SBAR). Information from the following report(s) SBAR, Kardex and MAR was reviewed with the receiving nurse. Opportunity for questions and clarification was provided. Assessment completed upon patients arrival to unit and care assumed.

## 2020-12-17 NOTE — ED NOTES
Bedside and Verbal shift change report given to Conrad Abraham (oncoming nurse) by Carina HIGGINBOTHAM (offgoing nurse). Report included the following information SBAR, Kardex, ED Summary, Intake/Output, Recent Results and Cardiac Rhythm NSR.

## 2020-12-17 NOTE — PROGRESS NOTES
9:08 AM  CM reviewed EMR and spoke to pt's daughter, Bobby Bustamante over the phone to complete the initial evaluation. Pt resides in an assisted living facility in [address] [facility]. Due to living in AL, pt has a COVID r/o order placed. Only two residents are currently living in the home. Per daughter, Elder Escobar they would like her to return to the AL at [place]. Reason for Admission:   Sepsis                   RUR Score:  10%                   Plan for utilizing home health:     Currently open with [facility]     PCP: First and Last name:  Thierno David   Name of Practice:    Are you a current patient: Yes/No: Yes but has been seeing the nurse or MD with [facility]   Approximate date of last visit: Recent-has been dealing with a UTI    Can you participate in a virtual visit with your PCP:                     Current Advanced Directive/Advance Care Plan: Full code, has ACP documents. Has two daughters listed as emergency contact. Bobby Bustamante and Lili Kuo                         Transition of Care Plan:  Pt resides in an private residence [place] in [address] is the owner of the home. Pt uses a RW when feeling good but has been weak and needing more assistance. 1). Pt admitted for medical management  2). PT/OT consulted  3). TBD on transportation at [place]  4). Resumption of care for [facility]  5). CM will follow for [place] needs    Care Management Interventions  PCP Verified by CM: Grady Perdomo home health nurse or MD)  Mode of Transport at Discharge:  Other (see comment)(TBD- depending on pt's condition at dc)  Transition of Care Consult (CM Consult): Discharge Planning  Current Support Network: Assisted Living(Private assisted living in [facility])  Discharge Location  Discharge Placement: Unable to determine at this time

## 2020-12-17 NOTE — PROGRESS NOTES
Problem: Falls - Risk of  Goal: *Absence of Falls  Description: Document Edmonia Morning Fall Risk and appropriate interventions in the flowsheet. Outcome: Progressing Towards Goal  Note: Fall Risk Interventions:  Mobility Interventions: Assess mobility with egress test, Bed/chair exit alarm, Communicate number of staff needed for ambulation/transfer, Patient to call before getting OOB    Mentation Interventions: Adequate sleep, hydration, pain control, Bed/chair exit alarm, Door open when patient unattended, Evaluate medications/consider consulting pharmacy, Family/sitter at bedside, Gait belt with transfers/ambulation    Medication Interventions: Assess postural VS orthostatic hypotension, Bed/chair exit alarm, Patient to call before getting OOB, Teach patient to arise slowly    Elimination Interventions: Bed/chair exit alarm, Call light in reach, Patient to call for help with toileting needs, Toilet paper/wipes in reach, Toileting schedule/hourly rounds              Problem: Patient Education: Go to Patient Education Activity  Goal: Patient/Family Education  Outcome: Progressing Towards Goal     Problem: Pressure Injury - Risk of  Goal: *Prevention of pressure injury  Description: Document Vimal Scale and appropriate interventions in the flowsheet. Outcome: Progressing Towards Goal  Note: Pressure Injury Interventions:  Sensory Interventions: Assess changes in LOC, Check visual cues for pain, Discuss PT/OT consult with provider, Float heels, Keep linens dry and wrinkle-free, Monitor skin under medical devices, Pressure redistribution bed/mattress (bed type), Sit a 90-degree angle/use footstool if needed, Turn and reposition approx.  every two hours (pillows and wedges if needed), Use 30-degree side-lying position    Moisture Interventions: Absorbent underpads, Apply protective barrier, creams and emollients, Check for incontinence Q2 hours and as needed, Internal/External urinary devices, Minimize layers, Moisture barrier, Offer toileting Q_hr    Activity Interventions: Chair cushion, Increase time out of bed, Pressure redistribution bed/mattress(bed type), PT/OT evaluation    Mobility Interventions: Float heels, HOB 30 degrees or less, Pressure redistribution bed/mattress (bed type), PT/OT evaluation    Nutrition Interventions: Document food/fluid/supplement intake, Discuss nutritional consult with provider, Offer support with meals,snacks and hydration    Friction and Shear Interventions: Apply protective barrier, creams and emollients, Feet elevated on foot rest, Foam dressings/transparent film/skin sealants, HOB 30 degrees or less, Lift team/patient mobility team, Sit at 90-degree angle, Transferring/repositioning devices

## 2020-12-18 ENCOUNTER — APPOINTMENT (OUTPATIENT)
Dept: CT IMAGING | Age: 85
DRG: 872 | End: 2020-12-18
Attending: INTERNAL MEDICINE
Payer: MEDICARE

## 2020-12-18 PROCEDURE — 74176 CT ABD & PELVIS W/O CONTRAST: CPT

## 2020-12-18 PROCEDURE — 74011000636 HC RX REV CODE- 636: Performed by: RADIOLOGY

## 2020-12-18 PROCEDURE — 74011250637 HC RX REV CODE- 250/637: Performed by: STUDENT IN AN ORGANIZED HEALTH CARE EDUCATION/TRAINING PROGRAM

## 2020-12-18 PROCEDURE — 74011250636 HC RX REV CODE- 250/636: Performed by: INTERNAL MEDICINE

## 2020-12-18 PROCEDURE — 65660000000 HC RM CCU STEPDOWN

## 2020-12-18 RX ORDER — PANTOPRAZOLE SODIUM 40 MG/1
40 TABLET, DELAYED RELEASE ORAL EVERY 12 HOURS
Status: DISCONTINUED | OUTPATIENT
Start: 2020-12-19 | End: 2020-12-19 | Stop reason: HOSPADM

## 2020-12-18 RX ADMIN — APIXABAN 2.5 MG: 2.5 TABLET, FILM COATED ORAL at 21:08

## 2020-12-18 RX ADMIN — PANTOPRAZOLE SODIUM 40 MG: 40 TABLET, DELAYED RELEASE ORAL at 06:41

## 2020-12-18 RX ADMIN — VANCOMYCIN HYDROCHLORIDE 1000 MG: 1 INJECTION, POWDER, LYOPHILIZED, FOR SOLUTION INTRAVENOUS at 14:56

## 2020-12-18 RX ADMIN — Medication 10 ML: at 21:10

## 2020-12-18 RX ADMIN — APIXABAN 2.5 MG: 2.5 TABLET, FILM COATED ORAL at 08:27

## 2020-12-18 RX ADMIN — BUPROPION HYDROCHLORIDE 150 MG: 150 TABLET, EXTENDED RELEASE ORAL at 06:41

## 2020-12-18 RX ADMIN — Medication 10 ML: at 06:41

## 2020-12-18 RX ADMIN — FLUOXETINE 40 MG: 20 CAPSULE ORAL at 08:27

## 2020-12-18 RX ADMIN — DILTIAZEM HYDROCHLORIDE 180 MG: 180 CAPSULE, COATED, EXTENDED RELEASE ORAL at 08:27

## 2020-12-18 RX ADMIN — IOHEXOL 50 ML: 240 INJECTION, SOLUTION INTRATHECAL; INTRAVASCULAR; INTRAVENOUS; ORAL at 14:57

## 2020-12-18 NOTE — PROGRESS NOTES
[facility] Dosing Services: Antimicrobial Stewardship Progress Note    Consult for antibiotic dosing of Vancomycin by Dr. Saskia Hurt;  Pharmacist reviewed antibiotic appropriateness for 80year old , female  for indication of UTI  Day of Therapy 1    Plan:  Vancomycin therapy:  LD: 1000mg x 1MD: 500mg every 24 hrs  Dose calculated to approximate a therapeutic trough of 10-15mcg/mL. Last trough level  Date Dose & Interval Measured (mcg/mL) Extrapolated (mcg/mL)   ? ? ? ?   ? ? ? ?   ? ? ? ? Plan for level:   Order trough level prior to 3rd dose (not done yet)    Pharmacy to follow daily and will make changes to dose and/or frequency based on clinical status. Other Antimicrobial  (not dosed by pharmacist)   None   Cultures     12/17: Urine- possible enterococcus- pending  12/17: Blood- NGTD- pending   Serum Creatinine     Lab Results   Component Value Date/Time    Creatinine 0.90 12/17/2020 04:11 AM    Creatinine (POC) 1.1 08/18/2013 06:32 PM       Creatinine Clearance Estimated Creatinine Clearance: 30.9 mL/min (based on SCr of 0.9 mg/dL). Procalcitonin  No results found for: PCT     Temp   97.7 °F (36.5 °C)    WBC   Lab Results   Component Value Date/Time    WBC 9.1 12/17/2020 04:11 AM       H/H   Lab Results   Component Value Date/Time    HGB 10.9 (L) 12/17/2020 04:11 AM      Platelets Lab Results   Component Value Date/Time    PLATELET 084 49/11/2951 04:11 AM            Pharmacist: Signed Marguerite Aschoff A. Georgi Arnt

## 2020-12-18 NOTE — PROGRESS NOTES
Comprehensive Nutrition Assessment    Type and Reason for Visit: Initial(low BMI)    Nutrition Recommendations/Plan:   1. Continue regular diet. 2. Add Ensure Enlive BID to promote intake and weight gain. 3. Please obtain new measured weight. Nutrition Assessment:      12/18: 79 y/o female admitted with severe sepsis. PMH includes GERD. BMI 18, c/w underweight. Pt provided limited hx at time of visit. Says she isn't hungry this AM. Drank some milk and took 3 bites of a banana during visit. She doesn't think she has had any weight changes and feels she was eating well PTA. C/o a little nausea and belching frequently during visit. Pt with visible muscle wasting in hands, clavicles, and temples. Says she has had ensure before, will add while here and monitor intakes. No weight changes noted per EMR, though CBW doesn't have a source listed so unsure of accuracy. Labs- reviewed. Meds- Protonix. Intakes:  Patient Vitals for the past 168 hrs:   % Diet Eaten   12/17/20 2015 0 %     Weight hx: Wt Readings from Last 10 Encounters:   12/16/20 49 kg (108 lb)   01/30/20 49 kg (108 lb)   01/18/19 49.9 kg (110 lb)   11/16/17 51 kg (112 lb 6.4 oz)   05/04/17 50.8 kg (112 lb)   02/09/17 51.3 kg (113 lb)   01/13/17 53.3 kg (117 lb 6.4 oz)   10/20/16 51.3 kg (113 lb)   07/22/16 51.7 kg (114 lb)   06/20/16 52.6 kg (116 lb)     Malnutrition Assessment:  Malnutrition Status: Moderate malnutrition    Context:  Chronic illness     Findings of the 6 clinical characteristics of malnutrition:   Energy Intake:  Unable to assess  Weight Loss:  Unable to assess     Body Fat Loss:  1 - Mild body fat loss, Fat overlying ribs   Muscle Mass Loss:  1 - Mild muscle mass loss, Hand (interosseous), Temples (temporalis), Clavicles (pectoralis &deltoids)  Fluid Accumulation:  No significant fluid accumulation,     Strength:  Not performed     Estimated Daily Nutrient Needs:  Energy (kcal): 1420(900 x 1.3 AF (+250));  Weight Used for Energy Requirements: Current  Protein (g): 57(1-1.2 g/kg IBW); Weight Used for Protein Requirements: Ideal  Fluid (ml/day): 1420; Method Used for Fluid Requirements: 1 ml/kcal    Nutrition Related Findings:  LBM not documented      Wounds:    None       Current Nutrition Therapies:  DIET REGULAR  DIET NUTRITIONAL SUPPLEMENTS Breakfast, Lunch;  Ensure Enlive    Anthropometric Measures:  · Height:  5' 5\" (165.1 cm)  · Current Body Wt:  49 kg (108 lb)   · Admission Body Wt:       · Usual Body Wt:        · Ideal Body Wt:  125 lbs:  86.4 %   · Adjusted Body Weight:   ; Weight Adjustment for: No adjustment   · Adjusted BMI:       · BMI Category:  Underweight (BMI less than 18.5)       Nutrition Diagnosis:   · Underweight related to inadequate protein-energy intake as evidenced by BMI      Nutrition Interventions:   Food and/or Nutrient Delivery: Continue current diet, Start oral nutrition supplement  Nutrition Education and Counseling: No recommendations at this time  Coordination of Nutrition Care: Continue to monitor while inpatient    Goals:  PO intake >50% meals + ONs with 0.5#/week weight gain next 2-4 days       Nutrition Monitoring and Evaluation:   Behavioral-Environmental Outcomes: None identified  Food/Nutrient Intake Outcomes: Food and nutrient intake, Supplement intake  Physical Signs/Symptoms Outcomes: Weight, Nutrition focused physical findings, Biochemical data    Discharge Planning:    Continue current diet, Continue oral nutrition supplement     Electronically signed by Fatoumata Pineda RDN on 12/18/2020 at 9:55 AM    Contact: [phone]

## 2020-12-18 NOTE — PROGRESS NOTES
Bedside shift change report given to Amanuel Bryant RN (oncoming nurse) by NEFTALY Horner (offgoing nurse).  Report included the following information SBAR, Kardex, Procedure Summary, Intake/Output, MAR and Recent Results

## 2020-12-18 NOTE — PROGRESS NOTES
Arnel Allan [facility]  [address] [facility], [address] [place] [address]  [phone]      Medical Progress Note      NAME: Rosana Romero   :  1928  MRM:  [id number]    Date of service: 2020  2:12 PM         Subjective:     Chief Complaint:  F/u UTI    Chart reviewed and patient examined. Afebrile overnight. Complains of abdominal pain. Denies chest pain shortness of breath. Objective:       Vitals:          Last 24hrs VS reviewed since prior progress note.  Most recent are:    Visit Vitals  /69 (BP 1 Location: Left arm, BP Patient Position: At rest)   Pulse 70   Temp 97.7 °F (36.5 °C)   Resp 16   Ht 5' 5\" (1.651 m)   Wt 49 kg (108 lb)   SpO2 96%   BMI 17.97 kg/m²     SpO2 Readings from Last 6 Encounters:   20 96%   20 98%   17 97%   17 98%   17 96%   17 98%            Intake/Output Summary (Last 24 hours) at 2020 1412  Last data filed at 2020 1328  Gross per 24 hour   Intake 480 ml   Output    Net 480 ml          Exam:     Physical Exam:    Gen: Elderly, in no acute distress  HEENT:  Pink conjunctivae, PERRL, hearing intact to voice  Resp:  No accessory muscle use, clear breath sounds without wheezes rales or rhonchi  Card:  No murmurs, normal S1, S2 without thrills  Abd:  Soft, non-tender, non-distended, normoactive bowel sounds are present  Musc:  No cyanosis or clubbing  Skin:  No rashes or ulcers, skin turgor is good  Neuro:  Cranial nerves 3-12 are grossly intact, moves all extremities   psych: Calm          Medications Reviewed: (see below)    Lab Data Reviewed: (see below)    ______________________________________________________________________    Medications:     Current Facility-Administered Medications   Medication Dose Route Frequency    apixaban (ELIQUIS) tablet 2.5 mg  2.5 mg Oral BID    buPROPion XL (WELLBUTRIN XL) tablet 150 mg  150 mg Oral 7am    dilTIAZem ER (CARDIZEM CD) capsule 180 mg  180 mg Oral DAILY    FLUoxetine (PROzac) capsule 40 mg  40 mg Oral DAILY    pantoprazole (PROTONIX) tablet 40 mg  40 mg Oral ACB    sodium chloride (NS) flush 5-40 mL  5-40 mL IntraVENous Q8H    sodium chloride (NS) flush 5-40 mL  5-40 mL IntraVENous PRN    polyethylene glycol (MIRALAX) packet 17 g  17 g Oral DAILY PRN    0.9% sodium chloride infusion  75 mL/hr IntraVENous CONTINUOUS    sodium chloride (NS) flush 5-10 mL  5-10 mL IntraVENous PRN    sodium chloride (NS) flush 5-40 mL  5-40 mL IntraVENous Q8H    sodium chloride (NS) flush 5-40 mL  5-40 mL IntraVENous PRN    acetaminophen (TYLENOL) tablet 650 mg  650 mg Oral Q4H PRN    HYDROcodone-acetaminophen (NORCO) 5-325 mg per tablet 1 Tab  1 Tab Oral Q4H PRN    naloxone (NARCAN) injection 0.4 mg  0.4 mg IntraVENous PRN    ondansetron (ZOFRAN) injection 4 mg  4 mg IntraVENous Q4H PRN            Lab Review:     Recent Labs     12/17/20  0411 12/16/20  1732   WBC 9.1 11.9*   HGB 10.9* 14.5   HCT 32.8* 43.0    370     Recent Labs     12/17/20  0411 12/16/20  1732    137   K 3.8 3.3*   * 100   CO2 28 25   GLU 95 139*   BUN 18 23*   CREA 0.90 1.20*   CA 8.6 9.6   MG 2.2 2.1   ALB  --  3.9   ALT  --  17     No components found for: GLPOC         Assessment / Plan:     Sepsis / UTI due to enterococcus: POA. Urine culture growing Enterococcus; awaiting sensitivities. Change ceftriaxone to IV vancomycin pending sensitivities. COVID-. Daughter reports frequent UTIs and just recently completed a course of abx. Abdominal pain: Possibly referred from UTI. Check CT abdomen pelvis. Elevated lactic acid: 6.3 on arrival, normalized with IVF. 2/2 above    Afib: rate controlled currently.  Continue dilt and eliquis    Depression: continue prozac and wellbutrin    GERD: continue protonix      Total time spent with patient: 28 0844 [place] discussed with: Patient and Family    Discussed:  Care Plan    Prophylaxis: eliquis    Disposition:   PT, OT, RN           ___________________________________________________    Attending Physician: Kimberlee Loco DO

## 2020-12-18 NOTE — PROGRESS NOTES
Bedside and Verbal shift change report given to Siri (oncoming nurse) by Chantelle Noble (offgoing nurse). Report included the following information SBAR, Kardex and MAR.

## 2020-12-18 NOTE — PROGRESS NOTES
Physician Progress Note      Ramya Fontenot  CSN #:                  [id number]  :                       1928  ADMIT DATE:       2020 5:16 PM  [address] [place] DATE:  RESPONDING  PROVIDER #:        TIAN ENRIQUEZ DO          QUERY TEXT:    Patient admitted with  Sepsis 2/2 UTI. Noted normal WBC 11.9 & Temp 97.4. Please indicate one of the following and document in the medical record: The medical record reflects the following:  Risk Factors: 81 yo F admitted with Sepsis 2/2 UTI. Clinical Indicators: WBC 11.9 Temp 97.4  RR 24  Pulse 81 LA 6.3  Urine cx- preliminary possible enterococcus  Treatment: IV 2L cc NS bolus, IV Maxipime, IV Rocpehin  Options provided:  -- Sepsis present as evidenced by, Please document evidence.   -- Sepsis was ruled out after study  -- Other - I will add my own diagnosis  -- Disagree - Not applicable / Not valid  -- Disagree - Clinically unable to determine / Unknown  -- Refer to Clinical Documentation Reviewer    PROVIDER RESPONSE TEXT:    Sepsis is present as evidenced by elevated RR and lactic acid    Query created by: Alvaro Mobley on 2020 9:56 AM      Electronically signed by:  Kanwal Guy DO 2020 2:31 PM

## 2020-12-18 NOTE — PROGRESS NOTES
12/18/2020   CARE MANAGEMENT NOTE:  CM reviewed EMR and handoff received from ER  Alli Ceja). Pt was admitted with sepsis UTI; also with Afib. Reportedly, pt resides in a private assisted living in [facility]. Dtr Salas Garcia ([phone]) is a family contact. Dtr Brock Johnson ([phone]) is also a family contact. RUR 10%    Transition Plan of Care:  1. PT/OT evals complete; pt was supervision level with rolling and mod assist to stand - home health was recommended. Pt is currently receiving homecare thru [facility]. A resumption order was received and faxed to agency. 2.  Outpt f/u  3. Mode of transportation to be determined upon discharge    CM will continue to follow pt until discharged.   Mal

## 2020-12-19 VITALS
HEIGHT: 65 IN | SYSTOLIC BLOOD PRESSURE: 137 MMHG | HEART RATE: 71 BPM | TEMPERATURE: 97.6 F | WEIGHT: 108 LBS | RESPIRATION RATE: 16 BRPM | DIASTOLIC BLOOD PRESSURE: 79 MMHG | BODY MASS INDEX: 17.99 KG/M2 | OXYGEN SATURATION: 96 %

## 2020-12-19 LAB
ALBUMIN SERPL-MCNC: 2.9 G/DL (ref 3.5–5)
ALBUMIN/GLOB SERPL: 1 {RATIO} (ref 1.1–2.2)
ALP SERPL-CCNC: 65 U/L (ref 45–117)
ALT SERPL-CCNC: 15 U/L (ref 12–78)
ANION GAP SERPL CALC-SCNC: 5 MMOL/L (ref 5–15)
AST SERPL-CCNC: 15 U/L (ref 15–37)
BACTERIA SPEC CULT: ABNORMAL
BASOPHILS # BLD: 0 K/UL (ref 0–0.1)
BASOPHILS NFR BLD: 1 % (ref 0–1)
BILIRUB SERPL-MCNC: 0.5 MG/DL (ref 0.2–1)
BUN SERPL-MCNC: 10 MG/DL (ref 6–20)
BUN/CREAT SERPL: 13 (ref 12–20)
CALCIUM SERPL-MCNC: 8.3 MG/DL (ref 8.5–10.1)
CC UR VC: ABNORMAL
CHLORIDE SERPL-SCNC: 109 MMOL/L (ref 97–108)
CO2 SERPL-SCNC: 27 MMOL/L (ref 21–32)
CREAT SERPL-MCNC: 0.77 MG/DL (ref 0.55–1.02)
DIFFERENTIAL METHOD BLD: ABNORMAL
EOSINOPHIL # BLD: 0.2 K/UL (ref 0–0.4)
EOSINOPHIL NFR BLD: 3 % (ref 0–7)
ERYTHROCYTE [DISTWIDTH] IN BLOOD BY AUTOMATED COUNT: 13 % (ref 11.5–14.5)
GLOBULIN SER CALC-MCNC: 3 G/DL (ref 2–4)
GLUCOSE SERPL-MCNC: 88 MG/DL (ref 65–100)
HCT VFR BLD AUTO: 36.1 % (ref 35–47)
HGB BLD-MCNC: 12 G/DL (ref 11.5–16)
IMM GRANULOCYTES # BLD AUTO: 0.1 K/UL (ref 0–0.04)
IMM GRANULOCYTES NFR BLD AUTO: 1 % (ref 0–0.5)
LYMPHOCYTES # BLD: 1.8 K/UL (ref 0.8–3.5)
LYMPHOCYTES NFR BLD: 23 % (ref 12–49)
MAGNESIUM SERPL-MCNC: 2.2 MG/DL (ref 1.6–2.4)
MCH RBC QN AUTO: 29.8 PG (ref 26–34)
MCHC RBC AUTO-ENTMCNC: 33.2 G/DL (ref 30–36.5)
MCV RBC AUTO: 89.6 FL (ref 80–99)
MONOCYTES # BLD: 0.9 K/UL (ref 0–1)
MONOCYTES NFR BLD: 12 % (ref 5–13)
NEUTS SEG # BLD: 4.9 K/UL (ref 1.8–8)
NEUTS SEG NFR BLD: 60 % (ref 32–75)
NRBC # BLD: 0 K/UL (ref 0–0.01)
NRBC BLD-RTO: 0 PER 100 WBC
PLATELET # BLD AUTO: 291 K/UL (ref 150–400)
PMV BLD AUTO: 9 FL (ref 8.9–12.9)
POTASSIUM SERPL-SCNC: 2.7 MMOL/L (ref 3.5–5.1)
POTASSIUM SERPL-SCNC: 4 MMOL/L (ref 3.5–5.1)
PROT SERPL-MCNC: 5.9 G/DL (ref 6.4–8.2)
RBC # BLD AUTO: 4.03 M/UL (ref 3.8–5.2)
SERVICE CMNT-IMP: ABNORMAL
SODIUM SERPL-SCNC: 141 MMOL/L (ref 136–145)
WBC # BLD AUTO: 8 K/UL (ref 3.6–11)

## 2020-12-19 PROCEDURE — 83735 ASSAY OF MAGNESIUM: CPT

## 2020-12-19 PROCEDURE — 85025 COMPLETE CBC W/AUTO DIFF WBC: CPT

## 2020-12-19 PROCEDURE — 80053 COMPREHEN METABOLIC PANEL: CPT

## 2020-12-19 PROCEDURE — 74011000258 HC RX REV CODE- 258: Performed by: INTERNAL MEDICINE

## 2020-12-19 PROCEDURE — 84132 ASSAY OF SERUM POTASSIUM: CPT

## 2020-12-19 PROCEDURE — 36415 COLL VENOUS BLD VENIPUNCTURE: CPT

## 2020-12-19 PROCEDURE — 74011250637 HC RX REV CODE- 250/637: Performed by: STUDENT IN AN ORGANIZED HEALTH CARE EDUCATION/TRAINING PROGRAM

## 2020-12-19 PROCEDURE — 74011250637 HC RX REV CODE- 250/637: Performed by: INTERNAL MEDICINE

## 2020-12-19 PROCEDURE — 74011250636 HC RX REV CODE- 250/636: Performed by: INTERNAL MEDICINE

## 2020-12-19 RX ORDER — POTASSIUM CHLORIDE 1.5 G/1.77G
20 POWDER, FOR SOLUTION ORAL DAILY
Qty: 30 PACKET | Refills: 0 | Status: SHIPPED | OUTPATIENT
Start: 2020-12-19 | End: 2021-02-23

## 2020-12-19 RX ORDER — POTASSIUM CHLORIDE 7.45 MG/ML
10 INJECTION INTRAVENOUS
Status: COMPLETED | OUTPATIENT
Start: 2020-12-19 | End: 2020-12-19

## 2020-12-19 RX ORDER — NITROFURANTOIN 25; 75 MG/1; MG/1
100 CAPSULE ORAL 2 TIMES DAILY
Qty: 10 CAP | Refills: 0 | Status: SHIPPED | OUTPATIENT
Start: 2020-12-20 | End: 2020-12-25

## 2020-12-19 RX ORDER — POTASSIUM CHLORIDE 750 MG/1
40 TABLET, FILM COATED, EXTENDED RELEASE ORAL
Status: COMPLETED | OUTPATIENT
Start: 2020-12-19 | End: 2020-12-19

## 2020-12-19 RX ADMIN — BUPROPION HYDROCHLORIDE 150 MG: 150 TABLET, EXTENDED RELEASE ORAL at 06:35

## 2020-12-19 RX ADMIN — POLYETHYLENE GLYCOL 3350 17 G: 17 POWDER, FOR SOLUTION ORAL at 08:34

## 2020-12-19 RX ADMIN — POTASSIUM CHLORIDE 10 MEQ: 10 INJECTION, SOLUTION INTRAVENOUS at 08:26

## 2020-12-19 RX ADMIN — POTASSIUM CHLORIDE 10 MEQ: 10 INJECTION, SOLUTION INTRAVENOUS at 09:40

## 2020-12-19 RX ADMIN — APIXABAN 2.5 MG: 2.5 TABLET, FILM COATED ORAL at 08:26

## 2020-12-19 RX ADMIN — Medication 10 ML: at 06:37

## 2020-12-19 RX ADMIN — PANTOPRAZOLE SODIUM 40 MG: 40 TABLET, DELAYED RELEASE ORAL at 08:26

## 2020-12-19 RX ADMIN — POTASSIUM CHLORIDE 40 MEQ: 750 TABLET, FILM COATED, EXTENDED RELEASE ORAL at 11:53

## 2020-12-19 RX ADMIN — DILTIAZEM HYDROCHLORIDE 180 MG: 180 CAPSULE, COATED, EXTENDED RELEASE ORAL at 08:26

## 2020-12-19 RX ADMIN — POTASSIUM CHLORIDE 40 MEQ: 750 TABLET, FILM COATED, EXTENDED RELEASE ORAL at 08:26

## 2020-12-19 RX ADMIN — FLUOXETINE 40 MG: 20 CAPSULE ORAL at 08:26

## 2020-12-19 RX ADMIN — VANCOMYCIN HYDROCHLORIDE 500 MG: 500 INJECTION, POWDER, LYOPHILIZED, FOR SOLUTION INTRAVENOUS at 16:09

## 2020-12-19 NOTE — PROGRESS NOTES
4:40 PM  Informed of discharge at this time. Spoke with daughter, Ms. Karo Goodwin, who is on her way to help transfer her Mother back to the home. Her sister plans to be here and provide transportation at [address].  Family aware of and in agreement with the [facility] and [facility]. Care Management Interventions  PCP Verified by CM: Amairani Padron home health nurse or MD)  Mode of Transport at Discharge:  Other (see comment)(TBD- depending on pt's condition at dc)  Transition of Care Consult (CM Consult): Discharge Planning  Current Support Network: Assisted Living(Private assisted living in [facility])  Discharge Location  Discharge Placement: Unable to determine at this time

## 2020-12-19 NOTE — DISCHARGE SUMMARY
Arnel Sanjana [facility] [address]  [address]  [phone]    Physician Discharge Summary     Patient ID:  Mukesh Rausch  [id number]  59 y.o.  2/12/1928    Admit date: 12/16/2020    Discharge date and time: 12/19/2020 3:26 PM    Admission Diagnoses: Severe sepsis ([facility] [address] [A41.9, R65.20]    Discharge Diagnoses:  Principal Diagnosis <principal problem not specified>                                            Active Problems:    Severe sepsis ([facility] [place] [address] (12/16/2020)           Hospital Course:    Sepsis / UTI due to enterococcus: POA. Urine culture growing Enterococcus; sensitive to nitrofurantoin which was given on discharge.    S/p IV therapy with vancomycin therapy prior to sensitivities returning. Hx of frequent UTI. Hypokalemia: possible due to poor po intake. Discharge don daily 20 supplement. Follow up with PCP. Abdominal pain: Possibly referred from UTI. CT abdomen pelvis noting gastritis. C/w PPI. Follow up with PCP outpatient.      Elevated lactic acid: 6.3 on arrival, normalized with IVF. 2/2 above sepsis.      Afib: rate controlled currently.  Continue dilt and eliquis     Depression: continue prozac and wellbutrin     GERD: continue PPI       PCP: Penny Washington MD     Consults: None    Significant Diagnostic Studies:  Ct abd pelvis   IMPRESSION:  Mild gastritis   Nonspecific abdominal gas pattern  Incidental pectus excavatum, emphysema, lumbar scoliosisv    Discharge Exam:  Physical Exam:    Gen: Elderly, in no acute distress  HEENT:  Pink conjunctivae, PERRL, hearing intact to voice  Resp:  No accessory muscle use, clear breath sounds without wheezes rales or rhonchi  Card:  No murmurs, normal S1, S2 without thrills  Abd:  Soft, non-tender, non-distended, normoactive bowel sounds are present  Musc:  No cyanosis or clubbing  Skin:  No rashes or ulcers, skin turgor is good  Neuro:  Cranial nerves 3-12 are grossly intact, moves all extremities   psych: Calm    Disposition: home  Discharge Condition: Stable    Patient Instructions:   Current Discharge Medication List      START taking these medications    Details   nitrofurantoin, macrocrystal-monohydrate, (MACROBID) 100 mg capsule Take 1 Cap by mouth two (2) times a day for 5 days. Qty: 10 Cap, Refills: 0      potassium chloride (Klor-Con) 20 mEq pack Take 1 Packet by mouth daily. Qty: 30 Packet, Refills: 0         CONTINUE these medications which have NOT CHANGED    Details   cholecalciferol (Vitamin D3) (1000 Units /25 mcg) tablet Take 1,000 Units by mouth daily. buPROPion XL (WELLBUTRIN XL) 150 mg tablet Take 150 mg by mouth every morning. FLUoxetine (PROzac) 40 mg capsule Take 40 mg by mouth daily. dilTIAZem ER (CARDIZEM CD) 180 mg capsule TAKE 1 CAPSULE BY MOUTH ONCE DAILY  Qty: 90 Cap, Refills: 3    Comments: Pt requested to use GoodRx. apixaban (Eliquis) 2.5 mg tablet TAKE 1 TABLET BY MOUTH TWO  TIMES DAILY  Qty: 180 Tab, Refills: 3      Omeprazole delayed release (PRILOSEC D/R) 20 mg tablet Take 20 mg by mouth Daily (before breakfast). acetaminophen (TYLENOL) 325 mg tablet Take 650 mg by mouth every six (6) hours as needed for Pain. fish oil-omega-3 fatty acids (FISH OIL) 340-1,000 mg capsule Take 1 Cap by mouth every evening. ascorbic acid (VITAMIN C) 500 mg tablet Take 1,000 mg by mouth daily (with dinner). Activity: Activity as tolerated  Diet: Cardiac Diet  Wound Care: None needed    Follow-up with  Follow-up Information     Follow up With Specialties Details Why Denise Villatoro [facility]  Resumption of all homecare. Please call agency directly with any questions.  [address] [facility], [address] [facility]    Martín Hernandez MD Family Medicine In 3 days To discuss hospital stay and follow up lab work  [address]  [phone] Follow-up tests/labs as above.      Signed:  Mila Lopez,   12/19/2020  3:26 PM  **I personally spent 37 min on discharge**

## 2020-12-19 NOTE — PROGRESS NOTES
Bedside and Verbal shift change report given to [address] (oncoming nurse) by Adeola Young (offgoing nurse). Report included the following information SBAR, Kardex, Procedure Summary, Intake/Output, MAR, Accordion, Recent Results and Med Rec Status.

## 2020-12-19 NOTE — PROGRESS NOTES
Discharge instructions provided to family. Time for questions and concerns allowed. Prescriptions provided. Home health resumed. IV removed. Family here to transport patient home.

## 2020-12-19 NOTE — DISCHARGE INSTRUCTIONS
HOSPITALIST DISCHARGE INSTRUCTIONS  NAME: Yasmine Lin   :  1928   MRN:  [id number]     Date/Time:  2020 3:17 PM    ADMIT DATE: 2020     DISCHARGE DATE: 2020     ADMITTING DIAGNOSIS:  Sepsis   UTI     DISCHARGE DIAGNOSIS:  Sepsis   UTI     Patient Education   Patient Education        Urinary Tract Infection in Women: Care Instructions  Your Care Instructions     A urinary tract infection, or UTI, is a general term for an infection anywhere between the kidneys and the urethra (where urine comes out). Most UTIs are bladder infections. They often cause pain or burning when you urinate. UTIs are caused by bacteria and can be cured with antibiotics. Be sure to complete your treatment so that the infection goes away. Follow-up care is a key part of your treatment and safety. Be sure to make and go to all appointments, and call your doctor if you are having problems. It's also a good idea to know your test results and keep a list of the medicines you take. How can you care for yourself at home? · Take your antibiotics as directed. Do not stop taking them just because you feel better. You need to take the full course of antibiotics. · Drink extra water and other fluids for the next day or two. This may help wash out the bacteria that are causing the infection. (If you have kidney, heart, or liver disease and have to limit fluids, talk with your doctor before you increase your fluid intake.)  · Avoid drinks that are carbonated or have caffeine. They can irritate the bladder. · Urinate often. Try to empty your bladder each time. · To relieve pain, take a hot bath or lay a heating pad set on low over your lower belly or genital area. Never go to sleep with a heating pad in place. To prevent UTIs  · Drink plenty of water each day. This helps you urinate often, which clears bacteria from your system.  (If you have kidney, heart, or liver disease and have to limit fluids, talk with your doctor before you increase your fluid intake.)  · Urinate when you need to. · Urinate right after you have sex. · Change sanitary pads often. · Avoid douches, bubble baths, feminine hygiene sprays, and other feminine hygiene products that have deodorants. · After going to the bathroom, wipe from front to back. When should you call for help? Call your doctor now or seek immediate medical care if:    · Symptoms such as fever, chills, nausea, or vomiting get worse or appear for the first time.     · You have new pain in your back just below your rib cage. This is called flank pain.     · There is new blood or pus in your urine.     · You have any problems with your antibiotic medicine. Watch closely for changes in your health, and be sure to contact your doctor if:    · You are not getting better after taking an antibiotic for 2 days.     · Your symptoms go away but then come back. Where can you learn more? Go to http://www.gray.com/  Enter S042 in the search box to learn more about \"Urinary Tract Infection in Women: Care Instructions. \"  Current as of: June 29, 2020               Content Version: 12.6  © 8242-5719 [employer]. Care instructions adapted under license by [employer] (which disclaims liability or warranty for this information). If you have questions about a medical condition or this instruction, always ask your healthcare professional. [address] any warranty or liability for your use of this information. Sepsis: Care Instructions  Overview     Sepsis is an intense reaction to an infection. It can cause damage to the body and lead to dangerously low blood pressure. You may have inflammation across large areas of your body. It can damage tissue and even go deep into your organs. Infections that can lead to sepsis include:  · A skin infection such as from a cut. · A lung infection like pneumonia.   · A kidney infection. · A gut infection such as E. coli. Sepsis is treated with antibiotics. Your doctor will try to find the infection that led to sepsis. Tapan Wilson also get fluids through a vein (IV). Machines will track your vital signs, including temperature, blood pressure, breathing rate, and pulse rate. The physical and mental effects of sepsis may not be seen for several weeks after treatment. And they may last long after the infection is gone. Physical problems may include:  · Feeling weak and tired. · Feeling out of breath. · Aches and pains. · Problems with getting around. · Trouble falling asleep or staying asleep. · Dry and itchy skin, brittle nails, and hair loss. Some of these effects can lead to problems with your organs or your feet, legs, hands, or arms. Sepsis can also affect your mind and emotions. Problems may include:  · Self-doubt. · Anxiety. · Nightmares. · Depression and mood problems. · Wanting to avoid other people. · Confusion. · Flashbacks and bad memories of your illness. It's important to care for yourself and try to avoid infections. This may lower your risk of getting sepsis again. Follow-up care is a key part of your treatment and safety. Be sure to make and go to all appointments, and call your doctor if you are having problems. It's also a good idea to know your test results and keep a list of the medicines you take. How can you care for yourself at home? · Be safe with medicines. Take your medicines exactly as prescribed. Call your doctor if you think you are having a problem with your medicine. · If your doctor prescribed antibiotics, take them as directed. Do not stop taking them just because you feel better. You need to take the full course of antibiotics. · Help prevent infections that could again lead to sepsis. ? Try to avoid colds and flu. If you must be around people who have a cold or the flu, wash your hands often. And get a flu vaccine every year. ?  Ask your doctor if you need a pneumococcal vaccine (to prevent pneumonia, meningitis, and other infections). If you have had one before, ask your doctor if you need another dose. ? Clean any wounds or scrapes. · Do not smoke or use other tobacco products. When you quit smoking, you are less likely to get a cold, the flu, bronchitis, and pneumonia. If you need help quitting, talk to your doctor about stop-smoking programs and medicines. These can increase your chances of quitting for good. · Drink plenty of fluids to prevent dehydration. Choose water and other caffeine-free clear liquids until you feel better. If you have kidney, heart, or liver disease and have to limit fluids, talk with your doctor before you increase the amount of fluids you drink. · Eat a healthy diet. Include fruits, vegetables, and whole grains in your diet every day. · If your doctor recommends it, try doing some physical activity. Walking is a good choice. Bit by bit, increase the amount you walk every day. · Talk with your family and friends about your challenges. Ask for help if you need it. · Keep a journal. Writing down your thoughts and feelings can help reduce your stress. · Ask family members to fill in gaps in your memory. · Set small goals for yourself that you can reach. Reward yourself for success. When should you call for help? Call  911 anytime you think you may need emergency care. For example, call if:    · You passed out (lost consciousness). Call your doctor now or seek immediate medical care if:    · You have symptoms such as:  ? Shortness of breath. ? Feeling very sick. ? Severe pain. ? A fast heart rate. ? Cool, pale, or clammy skin. ? Feeling confused. ? Feeling very sleepy, or you are hard to wake up.     · You are dizzy or lightheaded, or you feel like you may faint.     · You have a fever or chills.    Watch closely for changes in your health, and be sure to contact your doctor if:    · You do not get better as expected. Where can you learn more? Go to http://www.gray.com/  Enter T383 in the search box to learn more about \"Sepsis: Care Instructions. \"  Current as of: February 11, 2020               Content Version: 12.6  © 8914-5238 [employer]. Care instructions adapted under license by [employer] (which disclaims liability or warranty for this information). If you have questions about a medical condition or this instruction, always ask your healthcare professional. [address] any warranty or liability for your use of this information. MEDICATIONS:    · It is important that you take the medication exactly as they are prescribed. · Keep your medication in the bottles provided by the pharmacist and keep a list of the medication names, dosages, and times to be taken in your wallet. · Do not take other medications without consulting your doctor     Pain Management: per above medications    What to do at Home    Recommended diet:  Regular Diet    Recommended activity: Activity as tolerated    1) Return to the hospital if you feel worse    2) If you experience any of the following symptoms then please call your primary care physician or return to the emergency room if you cannot get hold of your doctor:  Fever, chills, nausea, vomiting, diarrhea, change in mentation, falling, bleeding, shortness of breath, chest pain, severe headache, severe abdominal pain. Follow Up: Follow-up Information     Follow up With Specialties Details Why Denise Villator[facility]  Resumption of all homecare. Please call agency directly with any questions.  [address] [facility], [address] [place] [address]    Guido Verdin MD Family Medicine In 3 days To discuss hospital stay and follow up lab work  [address]  [phone] Information obtained by :  I understand that if any problems occur once I am at home I am to contact my physician. I understand and acknowledge receipt of the instructions indicated above.                                                                                                                                            Physician's or R.N.'s Signature                                                                  Date/Time                                                                                                                                              Patient or Representative Signature                                                          Date/Time

## 2020-12-21 ENCOUNTER — PATIENT OUTREACH (OUTPATIENT)
Dept: CASE MANAGEMENT | Age: 85
End: 2020-12-21

## 2020-12-21 LAB
BACTERIA SPEC CULT: NORMAL
SERVICE CMNT-IMP: NORMAL

## 2020-12-21 NOTE — PROGRESS NOTES
Patient contacted regarding recent discharge and COVID-19 risk. Discussed COVID-19 related testing which was available at this time. Test results were negative. Patient informed of results, if available? no    Outreach made within 2 business days of discharge: Yes    Caregiver is Uriel Oliveira [id number] [facility] Transition Nurse/ Ambulatory Care Manager/ LPN Care Coordinator contacted the family by telephone to perform post discharge assessment. Verified name and  with family as identifiers. Patient has following risk factors of: sepsis. CTN/ACM/LPN reviewed discharge instructions, medical action plan and red flags related to discharge diagnosis. Reviewed and educated them on any new and changed medications related to discharge diagnosis. Advised obtaining a 90-day supply of all daily and as-needed medications. Advance Care Planning:   Does patient have an Advance Directive: yes; reviewed and current     Education provided regarding infection prevention, and signs and symptoms of COVID-19 and when to seek medical attention with family who verbalized understanding. Discussed exposure protocols and quarantine from [address] you at higher risk for severe illness  and given an opportunity for questions and concerns. The family agrees to contact the COVID-19 hotline [phone] or PCP office for questions related to their healthcare. CTN/ACM/LPN provided contact information for future reference. From CDC: Are you at higher risk for severe illness?  Wash your hands often.  Avoid close contact (6 feet, which is about two arm lengths) with people who are sick.  Put distance between yourself and other people if COVID-19 is spreading in your community.  Clean and disinfect frequently touched surfaces.  Avoid all cruise travel and non-essential air travel.    Call your healthcare professional if you have concerns about COVID-19 and your underlying condition or if you are sick.    For more information on steps you can take to protect yourself, see CDC's How to Protect Yourself      Patient/family/caregiver given information for GetWell Loop and agrees to enroll no  Patient's preferred e-mail:    Patient's preferred phone number:   Based on Loop alert triggers, patient will be contacted by nurse care manager for worsening symptoms. Plan for follow-up call in 7-14 days based on severity of symptoms and risk factors.   Daughter report Dafne Davidson [facility] saw patient today,taking ABX as prescribed, Dr. Tamia Jack will follow up with patient

## 2021-01-11 ENCOUNTER — VIRTUAL VISIT (OUTPATIENT)
Dept: CARDIOLOGY CLINIC | Age: 86
End: 2021-01-11
Payer: MEDICARE

## 2021-01-11 DIAGNOSIS — E78.00 PURE HYPERCHOLESTEROLEMIA: ICD-10-CM

## 2021-01-11 DIAGNOSIS — I48.0 PAROXYSMAL ATRIAL FIBRILLATION (HCC): Primary | ICD-10-CM

## 2021-01-11 PROCEDURE — G8536 NO DOC ELDER MAL SCRN: HCPCS | Performed by: SPECIALIST

## 2021-01-11 PROCEDURE — 1090F PRES/ABSN URINE INCON ASSESS: CPT | Performed by: SPECIALIST

## 2021-01-11 PROCEDURE — 99214 OFFICE O/P EST MOD 30 MIN: CPT | Performed by: SPECIALIST

## 2021-01-11 PROCEDURE — G0463 HOSPITAL OUTPT CLINIC VISIT: HCPCS | Performed by: SPECIALIST

## 2021-01-11 PROCEDURE — 1101F PT FALLS ASSESS-DOCD LE1/YR: CPT | Performed by: SPECIALIST

## 2021-01-11 PROCEDURE — G8427 DOCREV CUR MEDS BY ELIG CLIN: HCPCS | Performed by: SPECIALIST

## 2021-01-11 PROCEDURE — G8432 DEP SCR NOT DOC, RNG: HCPCS | Performed by: SPECIALIST

## 2021-01-11 PROCEDURE — G8419 CALC BMI OUT NRM PARAM NOF/U: HCPCS | Performed by: SPECIALIST

## 2021-01-11 PROCEDURE — 1111F DSCHRG MED/CURRENT MED MERGE: CPT | Performed by: SPECIALIST

## 2021-01-11 RX ORDER — METOPROLOL SUCCINATE 25 MG/1
25 TABLET, EXTENDED RELEASE ORAL
Qty: 90 TAB | Refills: 3 | Status: SHIPPED | OUTPATIENT
Start: 2021-01-11

## 2021-01-11 NOTE — PROGRESS NOTES
Jose Carlos Gonzales MD. [address] [facility], [address] [facility] [phone]; Fax [phone]        Patient: Manuela Montoya  : 1928      Today's Date: 2021        [facility] Cardiology Telemedicine Encounter                                                         Pursuant to the emergency declaration under the [address] [place] [address] waiver authority and the [employer] Act, this Virtual  Visit was conducted, with patient's consent, to reduce the patient's risk of exposure to COVID-19 and provide continuity of care for an established patient. Services were provided through a video synchronous discussion virtually to substitute for in-person clinic visit. HISTORY OF PRESENT ILLNESS:     History of Present Illness:    Manuela Montoya is a 80 y.o. female who was seen by synchronous (real-time) audio-video technology on 2021. Had recent admission for sepsis last month - Sepsis / UTI due to enterococcus: POA. Urine culture growing Enterococcus; sensitive to nitrofurantoin which was given on discharge. She's had some problems with confusion and dizziness. BP has been low and BP dropped in showed and she passed out. No bleeding. No CP or SOB. No orthopnea. Just one fall although unsteady on her feet. PAST MEDICAL HISTORY:     Past Medical History:   Diagnosis Date    Acid reflux     Depression     Hypercholesterolemia     PAF (paroxysmal atrial fibrillation) ([facility] [address]     On 6/17/15 she presented in Afib with RVR; spontaneously converted to NSR. Also Afib with RVR on 16.       Uterine prolapse            Past Surgical History:   Procedure Laterality Date    HX APPENDECTOMY      HX CHOLECYSTECTOMY             Patient Active Problem List   Diagnosis Code    Afib ([facility] [address] I48.91    Abnormal chest x-ray R93.89    GERD (gastroesophageal reflux disease) K21.9    Hyperlipidemia E78.5    Severe sepsis ([facility]) A41.9, R65.20             MEDICATIONS:     Current Outpatient Medications   Medication Sig Dispense Refill    potassium chloride (Klor-Con) 20 mEq pack Take 1 Packet by mouth daily. 30 Packet 0    cholecalciferol (Vitamin D3) (1000 Units /25 mcg) tablet Take 1,000 Units by mouth daily.  buPROPion XL (WELLBUTRIN XL) 150 mg tablet Take 150 mg by mouth every morning.  FLUoxetine (PROzac) 40 mg capsule Take 40 mg by mouth daily.  dilTIAZem ER (CARDIZEM CD) 180 mg capsule TAKE 1 CAPSULE BY MOUTH ONCE DAILY 90 Cap 3    apixaban (Eliquis) 2.5 mg tablet TAKE 1 TABLET BY MOUTH TWO  TIMES DAILY 180 Tab 3    Omeprazole delayed release (PRILOSEC D/R) 20 mg tablet Take 20 mg by mouth Daily (before breakfast).  acetaminophen (TYLENOL) 325 mg tablet Take 650 mg by mouth every six (6) hours as needed for Pain.  fish oil-omega-3 fatty acids (FISH OIL) 340-1,000 mg capsule Take 1 Cap by mouth every evening.  ascorbic acid (VITAMIN C) 500 mg tablet Take 1,000 mg by mouth daily (with dinner). Allergies   Allergen Reactions    Penicillins Swelling    Prednisone Other (comments)     Altered mental status. SOCIAL HISTORY:     Social History     Tobacco Use    Smoking status: Never Smoker    Smokeless tobacco: Never Used   Substance Use Topics    Alcohol use: No     Alcohol/week: 0.0 standard drinks    Drug use: No               FAMILY HISTORY:     Family History   Problem Relation Age of Onset    Heart Disease Brother         irregular heart beat               REVIEW OF SYMPTOMS:     Review of Symptoms:  Constitutional: Negative for fever, chills  HEENT: Negative for nosebleeds, vision changes. Respiratory: Negative for cough, wheezing  Cardiovascular: Negative for claudication, syncope  Gastrointestinal: Negative for abdominal pain, diarrhea, melena.    Genitourinary: Negative for dysuria  Musculoskeletal: Negative for myalgias. Skin: Negative for rash  Heme: No problems bleeding. Neurological: Negative for speech change and focal weakness. + doesn't sleep well, depression, poor balance               PHYSICAL EXAM:       Physical Exam:    Due to this being a TeleHealth evaluation, many elements of the physical examination are unable to be assessed. General: Well developed, in no acute distress, cooperative and alert. Appears stated age. HEENT: Hearing intact, non-icteric, normocephalic, atraumatic. Pupils equal/round. Moist mucous membranes. Lungs / Respiratory: No audible wheezing, no signs of respiratory distress, lips non cyanotic  Cardiac: No marked JVD visible on video. Extremities:  No edema  Neuro: speech clear, no facial droop, answering questions appropriately  Skin: Skin color is normal. No rashes or lesions. Non diaphoretic on visible skin during exam  Psych: Appropriate affect         LABS / OTHER STUDIES:         Lab Results   Component Value Date/Time    Cholesterol, total 183 06/18/2015 04:35 AM    HDL Cholesterol 59 06/18/2015 04:35 AM    LDL, calculated 87.4 06/18/2015 04:35 AM    Triglyceride 183 (H) 06/18/2015 04:35 AM    CHOL/HDL Ratio 3.1 06/18/2015 04:35 AM       Lab Results   Component Value Date/Time    Sodium 141 12/19/2020 05:45 AM    Potassium 4.0 12/19/2020 02:03 PM    Chloride 109 (H) 12/19/2020 05:45 AM    CO2 27 12/19/2020 05:45 AM    Anion gap 5 12/19/2020 05:45 AM    Glucose 88 12/19/2020 05:45 AM    BUN 10 12/19/2020 05:45 AM    Creatinine 0.77 12/19/2020 05:45 AM    BUN/Creatinine ratio 13 12/19/2020 05:45 AM    GFR est AA >60 12/19/2020 05:45 AM    GFR est non-AA >60 12/19/2020 05:45 AM    Calcium 8.3 (L) 12/19/2020 05:45 AM    Bilirubin, total 0.5 12/19/2020 05:45 AM    Alk.  phosphatase 65 12/19/2020 05:45 AM    Protein, total 5.9 (L) 12/19/2020 05:45 AM    Albumin 2.9 (L) 12/19/2020 05:45 AM    Globulin 3.0 12/19/2020 05:45 AM    A-G Ratio 1.0 (L) 12/19/2020 05:45 AM    ALT (SGPT) 15 12/19/2020 05:45 AM        Lab Results   Component Value Date/Time    WBC 8.0 12/19/2020 05:45 AM    Hemoglobin (POC) 13.3 08/18/2013 06:32 PM    HGB 12.0 12/19/2020 05:45 AM    Hematocrit (POC) 39 08/18/2013 06:32 PM    HCT 36.1 12/19/2020 05:45 AM    PLATELET 313 47/59/7384 05:45 AM    MCV 89.6 12/19/2020 05:45 AM       Lab Results   Component Value Date/Time    TSH 2.40 06/17/2015 04:55 PM           Labs 5/9/18 - Cr 1.22 otherwise CMP OK, chol 235, , HDL 63, , TSH 1.7         CARDIAC DIAGNOSTICS:          Cardiac Evaluation Includes:  Echo 6/17/15 - LVEF 55-60%, normal atrial sizes  Lexiscan Cardiolite 6/18/15 - normal perfusion, LVEF 77%  Loop Monitor 4/11/16-5/10/16 - no afib       EKG 6/17/15 - Afib with RVR, rate 124 (visualized by me)  EKG 6/17/15 - NSR, normal (visualized by me)  EKG 7/11/16 - Afib with RVR, rate 130, NSST changes ---> later converted to NSR, NSST changes    EKG 7/22/16 - NSR, PAC, non-specific T wave abn   EKG 7/22/16 - NSR, PAC, non-specific T wave abn, low voltage   EKG 1/18/19 - NSR, PRWP, low voltage             ASSESSMENT AND PLAN:          Assessment and Plan:  1) PAF    - On 6/17/15 she presented in Afib with RVR;. She spontaneously converted to NSR.    - On 7/11/16 she was seen with afib with RVR in [facility]. Converted to NSR on Diltiazem gtt. - She notes a couple of other episodes of PAF  - Continue Eliquis 2.5 mg BID ([id number] score is 3) - no major problems bleeding - I discussed risks and benefits of [address] and family wants to proceed with it for now.    - On 1/11/21 - BP has been running low ----> Stop Cardizem 180 and try Toprol XL 25 mg daily        2) Dyslipidemia   - I told her she can stop statin given lack of benefit in elderly      3) Depression   - goes to grief counseling  - she is looking better       4) See me back in 1 month.  Patient expressed understanding of the plan - questions were answered.       She lost her  of 76 years in March 2015.  She has 2 daughters - lives with her son. [facility] lived on a farm.               Ines Cleveland MD, [address] [place], [address]      [id number] [id number] [place] [address]  Ph: [phone]                                [phone]            We discussed the expected course, resolution and complications of the diagnosis(es) in detail. Medication risks, benefits, costs, interactions, and alternatives were discussed as indicated. I advised her to contact the office if her condition worsens, changes or fails to improve as anticipated. She expressed understanding with the diagnosis(es) and plan    Baby MD Rebel    Greater than 20 minutes was spent in direct video patient care, planning and chart review. This visit was conducted using [employer] telemedicine services.

## 2021-02-23 ENCOUNTER — VIRTUAL VISIT (OUTPATIENT)
Dept: CARDIOLOGY CLINIC | Age: 86
End: 2021-02-23
Payer: MEDICARE

## 2021-02-23 DIAGNOSIS — E78.00 PURE HYPERCHOLESTEROLEMIA: ICD-10-CM

## 2021-02-23 DIAGNOSIS — E87.6 LOW SERUM POTASSIUM: ICD-10-CM

## 2021-02-23 DIAGNOSIS — I48.0 PAROXYSMAL ATRIAL FIBRILLATION (HCC): Primary | ICD-10-CM

## 2021-02-23 PROCEDURE — G8419 CALC BMI OUT NRM PARAM NOF/U: HCPCS | Performed by: SPECIALIST

## 2021-02-23 PROCEDURE — G8432 DEP SCR NOT DOC, RNG: HCPCS | Performed by: SPECIALIST

## 2021-02-23 PROCEDURE — G8536 NO DOC ELDER MAL SCRN: HCPCS | Performed by: SPECIALIST

## 2021-02-23 PROCEDURE — 1090F PRES/ABSN URINE INCON ASSESS: CPT | Performed by: SPECIALIST

## 2021-02-23 PROCEDURE — 1101F PT FALLS ASSESS-DOCD LE1/YR: CPT | Performed by: SPECIALIST

## 2021-02-23 PROCEDURE — 99214 OFFICE O/P EST MOD 30 MIN: CPT | Performed by: SPECIALIST

## 2021-02-23 PROCEDURE — G0463 HOSPITAL OUTPT CLINIC VISIT: HCPCS | Performed by: SPECIALIST

## 2021-02-23 PROCEDURE — G8427 DOCREV CUR MEDS BY ELIG CLIN: HCPCS | Performed by: SPECIALIST

## 2021-02-23 NOTE — PROGRESS NOTES
Maureen Valero MD. [address] [place] [address], [id number] [place] [address] [phone]; Fax [phone]        Patient: Beulah Chisholm  : 1928      Today's Date: 2021        [facility] Cardiology Telemedicine Encounter                                                         Pursuant to the emergency declaration under the [address] [place] [address] [facility] authority and the [employer] Act, this Virtual  Visit was conducted, with patient's consent, to reduce the patient's risk of exposure to COVID-19 and provide continuity of care for an established patient. Services were provided through a video synchronous discussion virtually to substitute for in-person clinic visit. HISTORY OF PRESENT ILLNESS:     History of Present Illness:    Beulah Chisholm is a 80 y.o. female who was seen by synchronous (real-time) audio-video technology on 2021. She is doing better without any more fainting. Her BP has been good when nurses check it. No heart racing, bleeding, Chest pain. She is active. She uses a walker. PAST MEDICAL HISTORY:     Past Medical History:   Diagnosis Date    Acid reflux     Depression     Hypercholesterolemia     PAF (paroxysmal atrial fibrillation) ([facility] [address]     On 6/17/15 she presented in Afib with RVR; spontaneously converted to NSR. Also Afib with RVR on 16.       Uterine prolapse            Past Surgical History:   Procedure Laterality Date    HX APPENDECTOMY      HX CHOLECYSTECTOMY             Patient Active Problem List   Diagnosis Code    Afib ([facility] [address] I48.91    Abnormal chest x-ray R93.89    GERD (gastroesophageal reflux disease) K21.9    Hyperlipidemia E78.5    Severe sepsis (HCC) A41.9, R65.20             MEDICATIONS:     Current Outpatient Medications   Medication Sig Dispense Refill    metoprolol succinate (TOPROL-XL) 25 mg XL tablet Take 1 Tab by mouth nightly. 90 Tab 3    cholecalciferol (Vitamin D3) (1000 Units /25 mcg) tablet Take 1,000 Units by mouth daily.  buPROPion XL (WELLBUTRIN XL) 150 mg tablet Take 150 mg by mouth every morning.  FLUoxetine (PROzac) 40 mg capsule Take 40 mg by mouth daily.  apixaban (Eliquis) 2.5 mg tablet TAKE 1 TABLET BY MOUTH TWO  TIMES DAILY 180 Tab 3    Omeprazole delayed release (PRILOSEC D/R) 20 mg tablet Take 20 mg by mouth Daily (before breakfast).  acetaminophen (TYLENOL) 325 mg tablet Take 650 mg by mouth every six (6) hours as needed for Pain. Allergies   Allergen Reactions    Penicillins Swelling    Prednisone Other (comments)     Altered mental status. SOCIAL HISTORY:     Social History     Tobacco Use    Smoking status: Never Smoker    Smokeless tobacco: Never Used   Substance Use Topics    Alcohol use: No     Alcohol/week: 0.0 standard drinks    Drug use: No               FAMILY HISTORY:     Family History   Problem Relation Age of Onset    Heart Disease Brother         irregular heart beat               REVIEW OF SYMPTOMS:     Review of Symptoms:  Constitutional: Negative for fever, chills  HEENT: Negative for nosebleeds, vision changes. Respiratory: Negative for cough, wheezing  Cardiovascular: Negative for claudication, syncope  Gastrointestinal: Negative for abdominal pain, diarrhea, melena. Genitourinary: Negative for dysuria  Musculoskeletal: Negative for myalgias. Skin: Negative for rash  Heme: No problems bleeding. Neurological: Negative for speech change and focal weakness. + doesn't sleep well, depression, poor balance                     PHYSICAL EXAM:         Physical Exam:    Due to this being a TeleHealth evaluation, many elements of the physical examination are unable to be assessed.      General: Well developed, in no acute distress, cooperative and alert. Appears stated age.    HEENT: Hearing intact, non-icteric, normocephalic, atraumatic. Pupils equal/round. Moist mucous membranes. Lungs / Respiratory: No audible wheezing, no signs of respiratory distress, lips non cyanotic  Cardiac: No marked JVD visible on video. Extremities:  No edema  Neuro: speech clear, no facial droop, answering questions appropriately  Skin: Skin color is normal. No rashes or lesions. Non diaphoretic on visible skin during exam  Psych: Appropriate affect            LABS / OTHER STUDIES:        Lab Results   Component Value Date/Time    Sodium 141 12/19/2020 05:45 AM    Potassium 4.0 12/19/2020 02:03 PM    Chloride 109 (H) 12/19/2020 05:45 AM    CO2 27 12/19/2020 05:45 AM    Anion gap 5 12/19/2020 05:45 AM    Glucose 88 12/19/2020 05:45 AM    BUN 10 12/19/2020 05:45 AM    Creatinine 0.77 12/19/2020 05:45 AM    BUN/Creatinine ratio 13 12/19/2020 05:45 AM    GFR est AA >60 12/19/2020 05:45 AM    GFR est non-AA >60 12/19/2020 05:45 AM    Calcium 8.3 (L) 12/19/2020 05:45 AM    Bilirubin, total 0.5 12/19/2020 05:45 AM    Alk.  phosphatase 65 12/19/2020 05:45 AM    Protein, total 5.9 (L) 12/19/2020 05:45 AM    Albumin 2.9 (L) 12/19/2020 05:45 AM    Globulin 3.0 12/19/2020 05:45 AM    A-G Ratio 1.0 (L) 12/19/2020 05:45 AM    ALT (SGPT) 15 12/19/2020 05:45 AM    AST (SGOT) 15 12/19/2020 05:45 AM     Lab Results   Component Value Date/Time    WBC 8.0 12/19/2020 05:45 AM    Hemoglobin (POC) 13.3 08/18/2013 06:32 PM    HGB 12.0 12/19/2020 05:45 AM    Hematocrit (POC) 39 08/18/2013 06:32 PM    HCT 36.1 12/19/2020 05:45 AM    PLATELET 731 03/25/4367 05:45 AM    MCV 89.6 12/19/2020 05:45 AM     Lab Results   Component Value Date/Time    Cholesterol, total 183 06/18/2015 04:35 AM    HDL Cholesterol 59 06/18/2015 04:35 AM    LDL, calculated 87.4 06/18/2015 04:35 AM    VLDL, calculated 36.6 06/18/2015 04:35 AM    Triglyceride 183 (H) 06/18/2015 04:35 AM    CHOL/HDL Ratio 3.1 06/18/2015 04:35 AM                 CARDIAC DIAGNOSTICS:          Cardiac Evaluation Includes:  Echo 6/17/15 - LVEF 55-60%, normal atrial sizes  Lexiscan Cardiolite 6/18/15 - normal perfusion, LVEF 77%  Loop Monitor 4/11/16-5/10/16 - no afib       EKG 6/17/15 - Afib with RVR, rate 124 (visualized by me)  EKG 6/17/15 - NSR, normal (visualized by me)  EKG 7/11/16 - Afib with RVR, rate 130, NSST changes ---> later converted to NSR, NSST changes    EKG 7/22/16 - NSR, PAC, non-specific T wave abn   EKG 7/22/16 - NSR, PAC, non-specific T wave abn, low voltage   EKG 1/18/19 - NSR, PRWP, low voltage             ASSESSMENT AND PLAN:          Assessment and Plan:  1) PAF    - On 6/17/15 she presented in Afib with RVR;. She spontaneously converted to NSR.    - On 7/11/16 she was seen with afib with RVR in [facility]. Converted to NSR on Diltiazem gtt. - She notes a couple of other episodes of PAF  - Continue Eliquis 2.5 mg BID ([id number] score is 3) - no major problems bleeding - I discussed risks and benefits of [address] and family wants to proceed with it for now. - ON 2/23/21 - doing well - continue Toprol     2) Low K+ in past   - will check BMP and see if she needs chronic K+ supplement or not       3) Dyslipidemia   - I told her she does not need a statin given lack of benefit in very elderly     4) BP OK - cont Toprol      5) Depression   - goes to grief counseling  - she is looking better       6) See me back in 6 months - virtual visit.  Patient expressed understanding of the plan - questions were answered.       She lost her  of 76 years in March 2015.  She has 2 daughters - lives with her son. [place] Hickey lived [place] farm.              Evy Soler MD, [address] [facility] [address], [address] [place] [address]  Ph: [phone]    [phone]          We discussed the expected course, resolution and complications of the diagnosis(es) in detail.   Medication risks, benefits, costs, interactions, and alternatives were discussed as indicated. I advised her to contact the office if her condition worsens, changes or fails to improve as anticipated. She expressed understanding with the diagnosis(es) and plan    Jennifer Morales MD    This visit was conducted using [employer] telemedicine services.

## 2021-02-23 NOTE — PROGRESS NOTES
Reviewed PCP, allergens, meds, pharmacy. IF to continue K+, needs refill. Unable to take vitals; [facility].

## 2021-03-20 ENCOUNTER — HOSPITAL ENCOUNTER (EMERGENCY)
Age: 86
Discharge: NURSING, REHAB OR LONG-TERM CARE | End: 2021-03-20
Attending: EMERGENCY MEDICINE
Payer: MEDICARE

## 2021-03-20 ENCOUNTER — APPOINTMENT (OUTPATIENT)
Dept: GENERAL RADIOLOGY | Age: 86
End: 2021-03-20
Attending: EMERGENCY MEDICINE
Payer: MEDICARE

## 2021-03-20 VITALS
SYSTOLIC BLOOD PRESSURE: 135 MMHG | RESPIRATION RATE: 24 BRPM | HEART RATE: 68 BPM | TEMPERATURE: 97.8 F | OXYGEN SATURATION: 96 % | DIASTOLIC BLOOD PRESSURE: 97 MMHG

## 2021-03-20 DIAGNOSIS — Z20.822 CLOSE EXPOSURE TO COVID-19 VIRUS: ICD-10-CM

## 2021-03-20 DIAGNOSIS — J02.9 SORE THROAT: Primary | ICD-10-CM

## 2021-03-20 LAB — SARS-COV-2, COV2: NORMAL

## 2021-03-20 PROCEDURE — 99284 EMERGENCY DEPT VISIT MOD MDM: CPT

## 2021-03-20 PROCEDURE — 71045 X-RAY EXAM CHEST 1 VIEW: CPT

## 2021-03-20 PROCEDURE — U0005 INFEC AGEN DETEC AMPLI PROBE: HCPCS

## 2021-03-20 PROCEDURE — 77030038269 HC DRN EXT URIN PURWCK BARD -A

## 2021-03-20 NOTE — ED NOTES
Report received from Nicolle, [facility].  Assumed care of pt at this time

## 2021-03-20 NOTE — ED PROVIDER NOTES
Date of Service:  3/20/2021    Patient:  Martínez Guajardo    Chief Complaint:  Concern For COVID-19 (Coronavirus) and Sore Throat       HPI:  Martínez Guajardo is a 80 y.o.  female who presents for evaluation of Covid exposure. Patient lives at a nursing home where her parents staff member tested positive for Covid. This patient had contact with fatty caregiver several days ago and has since been complaining of a sore throat. Patient otherwise denies any other complaints was sent here for evaluation. Patient specifically denies chest pain shortness of breath abdominal pain fevers or chills           Past Medical History:   Diagnosis Date    Acid reflux     Depression     Hypercholesterolemia     PAF (paroxysmal atrial fibrillation) ([facility] [address]     On 6/17/15 she presented in Afib with RVR; spontaneously converted to NSR. Also Afib with RVR on 7/11/16.       Uterine prolapse        Past Surgical History:   Procedure Laterality Date    HX APPENDECTOMY      HX CHOLECYSTECTOMY           Family History:   Problem Relation Age of Onset    Heart Disease Brother         irregular heart beat       Social History     Socioeconomic History    Marital status:      Spouse name: Not on file    Number of children: Not on file    Years of education: Not on file    Highest education level: Not on file   Occupational History    Not on file   Social Needs    Financial resource strain: Not on file    Food insecurity     Worry: Not on file     Inability: Not on file    Transportation needs     Medical: Not on file     Non-medical: Not on file   Tobacco Use    Smoking status: Never Smoker    Smokeless tobacco: Never Used   Substance and Sexual Activity    Alcohol use: No     Alcohol/week: 0.0 standard drinks    Drug use: No    Sexual activity: Not on file   Lifestyle    Physical activity     Days per week: Not on file     Minutes per session: Not on file    Stress: Not on file   Relationships    Social connections     Talks on phone: Not on file     Gets together: Not on file     Attends Jainism service: Not on file     Active member of club or organization: Not on file     Attends meetings of clubs or organizations: Not on file     Relationship status: Not on file    Intimate partner violence     Fear of current or ex partner: Not on file     Emotionally abused: Not on file     Physically abused: Not on file     Forced sexual activity: Not on file   Other Topics Concern    Not on file   Social History Narrative    Not on file         ALLERGIES: Penicillins and Prednisone    Review of Systems   Constitutional: Negative for fever. HENT: Positive for sore throat. Negative for hearing loss. Eyes: Negative for visual disturbance. Respiratory: Negative for shortness of breath. Cardiovascular: Negative for chest pain. Gastrointestinal: Negative for abdominal pain. Genitourinary: Negative for flank pain. Musculoskeletal: Negative for back pain. Skin: Negative for rash. Neurological: Negative for dizziness and light-headedness. Psychiatric/Behavioral: Negative for confusion. Vitals:    03/20/21 1107 03/20/21 1108 03/20/21 1109 03/20/21 1130   BP: (!) 132/58  (!) 132/58 138/69   Pulse:   68    Resp:   24    Temp:   97.8 °F (36.6 °C)    SpO2:  98% 100% 99%            Physical Exam  Vitals signs and nursing note reviewed. Constitutional:       General: She is not in acute distress. Appearance: She is well-developed. She is not ill-appearing or toxic-appearing. HENT:      Head: Normocephalic and atraumatic. Mouth/Throat:      Mouth: Mucous membranes are dry. No oral lesions. Pharynx: Oropharynx is clear. Uvula midline. No pharyngeal swelling, oropharyngeal exudate, posterior oropharyngeal erythema or uvula swelling. Eyes:      General: No scleral icterus. Conjunctiva/sclera: Conjunctivae normal.      Pupils: Pupils are equal, round, and reactive to light.    Neck: Musculoskeletal: Neck supple. Vascular: No JVD. Trachea: No tracheal deviation. Cardiovascular:      Rate and Rhythm: Normal rate and regular rhythm. Pulmonary:      Effort: Pulmonary effort is normal. No respiratory distress. Breath sounds: Normal breath sounds. Comments: Referred upper air way noises and chest congestion  Abdominal:      Palpations: Abdomen is soft. Tenderness: There is no abdominal tenderness. Musculoskeletal: Normal range of motion. Right lower leg: No edema. Left lower leg: No edema. Skin:     General: Skin is warm and dry. Capillary Refill: Capillary refill takes less than 2 seconds. Findings: No rash. Neurological:      Mental Status: She is alert and oriented to person, place, and time. Psychiatric:         Mood and Affect: Mood normal.         Behavior: Behavior normal.          MDM  Number of Diagnoses or Management Options       VITAL SIGNS:  Patient Vitals for the past 4 hrs:   BP SpO2   03/20/21 1430 (!) 118/99 97 %   03/20/21 1400 132/63    03/20/21 1330 (!) 114/91 97 %   03/20/21 1300 (!) 132/59 97 %   03/20/21 1230 136/64 97 %   03/20/21 1200 137/61 97 %   03/20/21 1130 138/69 99 %         LABS:  Recent Results (from the past 6 hour(s))   SARS-COV-2    Collection Time: 03/20/21 12:19 PM   Result Value Ref Range    SARS-CoV-2 Please find results under separate order          IMAGING:  XR CHEST PORT   Final Result   No acute process. Medications During Visit:  Medications - No data to display      DECISION MAKING:  Beulah Chisholm is a 80 y.o. female who comes in as above. Patient has no complaints. Xray shows no acute process. Vitals unremarkable with no signs of distress. COVID test ordered. Patient to be discharged back to place of residence. IMPRESSION:  1.  Sore throat    2. Close exposure to COVID-19 virus        DISPOSITION:  Discharged      Current Discharge Medication List           Follow-up Information Follow up With Specialties Details Why Contact Info    Raquel Kinney MD [facility] Schedule an appointment as soon as possible for a visit   [address]  [phone]              The patient is asked to follow-up with their primary care provider in the next several days. They are to call tomorrow for an appointment. The patient is asked to return promptly for any increased concerns or worsening of symptoms. They can return to this emergency department or any other emergency department.     Procedures

## 2021-03-20 NOTE — ED NOTES
Verbal/Bedside report was given to [address] who will assume care of patient at this time. SBAR report consisted of ED summary, MAR, recent results, and additional pertinent information. Receiving nurse given opportunity to ask questions and seek clarifications. Receiving nurse aware of pending lab results and orders that are to be completed.

## 2021-03-20 NOTE — ED TRIAGE NOTES
Patient arrives to ED via EMS from assisted living facility with c/o covid exposure, sore throat, cough. EMS reports that facility staff tested positive and patient c/o sore throat 2 days later, staff at facility reports cough x 2 days.

## 2021-03-21 LAB
SARS-COV-2, COV2: NOT DETECTED
SPECIMEN SOURCE, FCOV2M: NORMAL

## 2021-04-28 NOTE — TELEPHONE ENCOUNTER
Per Dr. Nati Briseno VO:  KZZ:[id number]  Future Appointments   Date Time Provider Carlos Enedina   8/24/2021 12:00 PM Lakeshia Mcelroy MD [facility]     Requested Prescriptions     Pending Prescriptions Disp Refills    apixaban (Eliquis) 2.5 mg tablet [Pharmacy Med Name: ELIQUIS  2.5MG  TAB] 180 Tab 3     Sig: TAKE 1 TABLET BY MOUTH  TWICE DAILY
